# Patient Record
Sex: FEMALE | Race: WHITE | NOT HISPANIC OR LATINO | URBAN - METROPOLITAN AREA
[De-identification: names, ages, dates, MRNs, and addresses within clinical notes are randomized per-mention and may not be internally consistent; named-entity substitution may affect disease eponyms.]

---

## 2023-05-05 ENCOUNTER — OFFICE VISIT (OUTPATIENT)
Dept: PRIMARY CARE | Facility: CLINIC | Age: 43
End: 2023-05-05
Payer: COMMERCIAL

## 2023-05-05 ENCOUNTER — LAB (OUTPATIENT)
Dept: LAB | Facility: LAB | Age: 43
End: 2023-05-05
Payer: COMMERCIAL

## 2023-05-05 VITALS — WEIGHT: 287 LBS | BODY MASS INDEX: 46.12 KG/M2 | HEIGHT: 66 IN

## 2023-05-05 DIAGNOSIS — K64.4 BLEEDING EXTERNAL HEMORRHOIDS: Primary | ICD-10-CM

## 2023-05-05 DIAGNOSIS — E03.9 HYPOTHYROIDISM, UNSPECIFIED TYPE: ICD-10-CM

## 2023-05-05 DIAGNOSIS — K64.4 BLEEDING EXTERNAL HEMORRHOIDS: ICD-10-CM

## 2023-05-05 DIAGNOSIS — K64.5 THROMBOSED HEMORRHOIDS: ICD-10-CM

## 2023-05-05 PROBLEM — R20.0 BILATERAL LEG NUMBNESS: Status: ACTIVE | Noted: 2021-09-29

## 2023-05-05 PROBLEM — F41.8 DEPRESSION WITH ANXIETY: Status: ACTIVE | Noted: 2021-09-29

## 2023-05-05 PROBLEM — R59.0 CERVICAL LYMPHADENOPATHY: Status: ACTIVE | Noted: 2022-01-10

## 2023-05-05 PROBLEM — J39.0 RETROPHARYNGEAL AND PARAPHARYNGEAL ABSCESS: Status: ACTIVE | Noted: 2023-05-05

## 2023-05-05 PROBLEM — M79.89 SWELLING OF BOTH LOWER EXTREMITIES: Status: ACTIVE | Noted: 2021-11-04

## 2023-05-05 PROBLEM — E66.01 OBESITY, CLASS III, BMI 40-49.9 (MORBID OBESITY) (MULTI): Chronic | Status: ACTIVE | Noted: 2021-09-29

## 2023-05-05 PROBLEM — M25.569 KNEE PAIN: Status: ACTIVE | Noted: 2023-05-05

## 2023-05-05 PROBLEM — D64.9 ANEMIA: Status: ACTIVE | Noted: 2023-05-05

## 2023-05-05 PROBLEM — R20.0 NUMBNESS AND TINGLING: Status: ACTIVE | Noted: 2023-05-05

## 2023-05-05 PROBLEM — R20.2 NUMBNESS AND TINGLING: Status: ACTIVE | Noted: 2023-05-05

## 2023-05-05 PROBLEM — E66.813 OBESITY, CLASS III, BMI 40-49.9 (MORBID OBESITY): Chronic | Status: ACTIVE | Noted: 2021-09-29

## 2023-05-05 PROBLEM — E55.9 VITAMIN D DEFICIENCY: Status: ACTIVE | Noted: 2021-10-04

## 2023-05-05 PROBLEM — D50.9 IRON DEFICIENCY ANEMIA: Status: ACTIVE | Noted: 2021-10-04

## 2023-05-05 PROBLEM — I10 PRIMARY HYPERTENSION: Status: ACTIVE | Noted: 2022-01-10

## 2023-05-05 PROBLEM — E78.5 HYPERLIPIDEMIA: Status: ACTIVE | Noted: 2021-11-04

## 2023-05-05 PROBLEM — R53.83 FATIGUE: Status: ACTIVE | Noted: 2021-12-09

## 2023-05-05 PROBLEM — F34.1 PERSISTENT DEPRESSIVE DISORDER: Status: ACTIVE | Noted: 2021-11-06

## 2023-05-05 PROBLEM — H57.89 PERIORBITAL SWELLING: Status: ACTIVE | Noted: 2023-05-05

## 2023-05-05 PROBLEM — M54.9 BACK PAIN: Status: ACTIVE | Noted: 2023-05-05

## 2023-05-05 PROBLEM — H53.142: Status: ACTIVE | Noted: 2023-05-05

## 2023-05-05 PROBLEM — M25.50 ARTHRALGIA OF MULTIPLE SITES: Status: ACTIVE | Noted: 2023-05-05

## 2023-05-05 PROBLEM — F43.10 PTSD (POST-TRAUMATIC STRESS DISORDER): Status: ACTIVE | Noted: 2021-09-22

## 2023-05-05 PROBLEM — R06.83 SNORING: Status: ACTIVE | Noted: 2023-05-05

## 2023-05-05 PROBLEM — M62.830 PARASPINAL MUSCLE SPASM: Status: ACTIVE | Noted: 2021-09-29

## 2023-05-05 PROBLEM — R21 RASH OF FACE: Status: ACTIVE | Noted: 2023-05-05

## 2023-05-05 PROBLEM — F33.1 MODERATE EPISODE OF RECURRENT MAJOR DEPRESSIVE DISORDER (MULTI): Chronic | Status: ACTIVE | Noted: 2021-09-22

## 2023-05-05 PROBLEM — J02.9 SORE THROAT: Status: ACTIVE | Noted: 2023-05-05

## 2023-05-05 PROBLEM — R22.1 NECK MASS: Status: ACTIVE | Noted: 2021-09-29

## 2023-05-05 PROBLEM — R73.03 PREDIABETES: Status: ACTIVE | Noted: 2021-10-04

## 2023-05-05 PROCEDURE — 36415 COLL VENOUS BLD VENIPUNCTURE: CPT

## 2023-05-05 PROCEDURE — 84439 ASSAY OF FREE THYROXINE: CPT

## 2023-05-05 PROCEDURE — 85027 COMPLETE CBC AUTOMATED: CPT

## 2023-05-05 PROCEDURE — 99214 OFFICE O/P EST MOD 30 MIN: CPT | Performed by: INTERNAL MEDICINE

## 2023-05-05 PROCEDURE — 84443 ASSAY THYROID STIM HORMONE: CPT

## 2023-05-05 PROCEDURE — 80053 COMPREHEN METABOLIC PANEL: CPT

## 2023-05-05 PROCEDURE — 80061 LIPID PANEL: CPT

## 2023-05-05 RX ORDER — LEVOTHYROXINE SODIUM 150 UG/1
150 TABLET ORAL DAILY
Qty: 30 TABLET | Refills: 0 | Status: SHIPPED | OUTPATIENT
Start: 2023-05-05 | End: 2023-06-06 | Stop reason: ALTCHOICE

## 2023-05-05 RX ORDER — ATORVASTATIN CALCIUM 40 MG/1
40 TABLET, FILM COATED ORAL
COMMUNITY
Start: 2021-12-09 | End: 2023-06-06 | Stop reason: SDUPTHER

## 2023-05-05 RX ORDER — HYDROCORTISONE 25 MG/G
CREAM TOPICAL 4 TIMES DAILY PRN
Qty: 30 G | Refills: 3 | Status: SHIPPED | OUTPATIENT
Start: 2023-05-05 | End: 2023-06-06 | Stop reason: ALTCHOICE

## 2023-05-05 RX ORDER — LEVOTHYROXINE SODIUM 150 UG/1
1 TABLET ORAL DAILY
COMMUNITY
Start: 2022-05-24 | End: 2023-05-05 | Stop reason: SDUPTHER

## 2023-05-05 RX ORDER — VALACYCLOVIR HYDROCHLORIDE 1 G/1
1 TABLET, FILM COATED ORAL 3 TIMES DAILY
COMMUNITY
Start: 2022-07-25

## 2023-05-05 RX ORDER — LEVOTHYROXINE SODIUM 175 UG/1
175 TABLET ORAL
COMMUNITY
Start: 2022-02-02 | End: 2023-06-06 | Stop reason: SDUPTHER

## 2023-05-05 NOTE — PROGRESS NOTES
"Subjective   Patient ID: Elli Mejia is a 42 y.o. female who presents for Rectal Bleeding.    HPI   Patient is here for complaints of having rectal bleeding for the past 5 days.  She is having a lot of rectal spasm and pain and constant bleeding.  She was constipated before and had pushed hard and since then she was having symptoms  She is also overdue for blood work for cholesterol and thyroid     Past recap patient is here with complaints of rash on the left upper eyelid started on Friday. Over the weekend it spread the whole eyelid and she is having throbbing pain in the left eye also. Denies any discharge from the eye vision is okay feeling some photophobia  Patient is concerned that she has shingles because her mother had similar presentation with shingles        Patient is here for follow-up on blood work  Complaining of lower back pain  Complaining of right knee pain  Complaining of right elbow pain and right arm numbness and tingling  Complaining of not taking having enough strength in the right arm  She has lot of joint pains muscle pains and concern about having rheumatoid arthritis or lupus-like condition  Follow-up on hypothyroidism        past recap  To establish PCP  Patient is here for blood pressure check  Blood pressure running high  Feels very tired fatigue  Hypothyroidism        Past medical history: Depression, hypertension, tonsillectomy  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   Constitutional:       Appearance: Normal appearance.   HENT:      Head: Normocephalic and atraumatic.      Right Ear: Tympanic membrane, ear canal and external ear normal.      Left Ear: Tympanic membrane, ear canal and external ear " normal.      Nose: Nose normal.      Mouth/Throat:      Pharynx: Oropharynx is clear.   Eyes:      Extraocular Movements: Extraocular movements intact.      Conjunctiva/sclera: Conjunctivae normal.      Pupils: Pupils are equal, round, and reactive to light.   Cardiovascular:      Rate and Rhythm: Normal rate and regular rhythm.      Pulses: Normal pulses.      Heart sounds: Normal heart sounds.   Pulmonary:      Effort: Pulmonary effort is normal.      Breath sounds: Normal breath sounds.   Abdominal:      General: Abdomen is flat. Bowel sounds are normal.      Palpations: Abdomen is soft.      Comments: Thrombosed hemorrhoid   Musculoskeletal:      Cervical back: Normal range of motion and neck supple.   Skin:     General: Skin is warm and dry.   Neurological:      General: No focal deficit present.      Mental Status: She is alert and oriented to person, place, and time.   Psychiatric:         Mood and Affect: Mood normal.         Assessment/Plan   Problem List Items Addressed This Visit          Circulatory    Thrombosed hemorrhoids    Relevant Medications    hydrocortisone (Anusol-HC) 2.5 % rectal cream    Other Relevant Orders    CBC    Comprehensive Metabolic Panel    Lipid Panel    Thyroid Stimulating Hormone    Thyroxine, Free    Bleeding external hemorrhoids - Primary    Relevant Medications    hydrocortisone (Anusol-HC) 2.5 % rectal cream    Other Relevant Orders    CBC    Comprehensive Metabolic Panel    Lipid Panel    Thyroid Stimulating Hormone    Thyroxine, Free       Endocrine/Metabolic    Hypothyroidism    Relevant Medications    levothyroxine (Synthroid, Levoxyl) 150 mcg tablet    Other Relevant Orders    CBC    Comprehensive Metabolic Panel    Lipid Panel    Thyroid Stimulating Hormone    Thyroxine, Free       past recap  Patient's old chart reviewed  Blood pressure is high start Toprol-XL 25 mg a day  Check TSH for thyroid  Anemia on last blood work recheck CBC iron studies B12  Patient is high  risk for sleep apnea and she does snore  We will do sleep study  Follow-up after the test     56/31  To lumbosacral spine  X-ray right knee  Clinically appears that patient has tendinitis in the right elbow  We will get EMG test  Discussed using carpal tunnel brace we will do blood work to rule out connective tissue disorder  Encourage patient to speak  Discussed diet and exercise  Fatigue presenting for treat with anti-inflammatory  Blood work reviewed TSH suppressed  Decrease dose to 150 5days a week and 175 on weekends  6 weeks  Follow-up after the test     7/25  Rash definitely does not look like shingles  But patient is extremely worried about shingles and wants to be treated as shingles and if not better she will follow-up  Valtrex prescribed  Refer to ophthalmologist for photophobia very slight    5/5/2023  Patient has bleeding hemorrhoid and thrombosed small in size  Hemorrhoid care discussed  ProctoCream prescribed   Blood work ordered for thyroid and cholesterol  Stool softener  Follow-up after blood work next week

## 2023-05-06 LAB
ALANINE AMINOTRANSFERASE (SGPT) (U/L) IN SER/PLAS: 11 U/L (ref 7–45)
ALBUMIN (G/DL) IN SER/PLAS: 5 G/DL (ref 3.4–5)
ALKALINE PHOSPHATASE (U/L) IN SER/PLAS: 77 U/L (ref 33–110)
ANION GAP IN SER/PLAS: 17 MMOL/L (ref 10–20)
ASPARTATE AMINOTRANSFERASE (SGOT) (U/L) IN SER/PLAS: 16 U/L (ref 9–39)
BILIRUBIN TOTAL (MG/DL) IN SER/PLAS: 0.3 MG/DL (ref 0–1.2)
CALCIUM (MG/DL) IN SER/PLAS: 10.2 MG/DL (ref 8.6–10.6)
CARBON DIOXIDE, TOTAL (MMOL/L) IN SER/PLAS: 24 MMOL/L (ref 21–32)
CHLORIDE (MMOL/L) IN SER/PLAS: 101 MMOL/L (ref 98–107)
CHOLESTEROL (MG/DL) IN SER/PLAS: 263 MG/DL (ref 0–199)
CHOLESTEROL IN HDL (MG/DL) IN SER/PLAS: 41.3 MG/DL
CHOLESTEROL/HDL RATIO: 6.4
CREATININE (MG/DL) IN SER/PLAS: 0.96 MG/DL (ref 0.5–1.05)
ERYTHROCYTE DISTRIBUTION WIDTH (RATIO) BY AUTOMATED COUNT: 14 % (ref 11.5–14.5)
ERYTHROCYTE MEAN CORPUSCULAR HEMOGLOBIN CONCENTRATION (G/DL) BY AUTOMATED: 32.1 G/DL (ref 32–36)
ERYTHROCYTE MEAN CORPUSCULAR VOLUME (FL) BY AUTOMATED COUNT: 94 FL (ref 80–100)
ERYTHROCYTES (10*6/UL) IN BLOOD BY AUTOMATED COUNT: 4.35 X10E12/L (ref 4–5.2)
GFR FEMALE: 76 ML/MIN/1.73M2
GLUCOSE (MG/DL) IN SER/PLAS: 100 MG/DL (ref 74–99)
HEMATOCRIT (%) IN BLOOD BY AUTOMATED COUNT: 40.8 % (ref 36–46)
HEMOGLOBIN (G/DL) IN BLOOD: 13.1 G/DL (ref 12–16)
LDL: 159 MG/DL (ref 0–99)
LEUKOCYTES (10*3/UL) IN BLOOD BY AUTOMATED COUNT: 11.6 X10E9/L (ref 4.4–11.3)
NON HDL CHOLESTEROL: 222 MG/DL
NRBC (PER 100 WBCS) BY AUTOMATED COUNT: 0 /100 WBC (ref 0–0)
PLATELETS (10*3/UL) IN BLOOD AUTOMATED COUNT: 338 X10E9/L (ref 150–450)
POTASSIUM (MMOL/L) IN SER/PLAS: 4.5 MMOL/L (ref 3.5–5.3)
PROTEIN TOTAL: 7.9 G/DL (ref 6.4–8.2)
SODIUM (MMOL/L) IN SER/PLAS: 137 MMOL/L (ref 136–145)
THYROTROPIN (MIU/L) IN SER/PLAS BY DETECTION LIMIT <= 0.05 MIU/L: 61 MIU/L (ref 0.44–3.98)
THYROXINE (T4) FREE (NG/DL) IN SER/PLAS: 0.28 NG/DL (ref 0.78–1.48)
TRIGLYCERIDE (MG/DL) IN SER/PLAS: 312 MG/DL (ref 0–149)
UREA NITROGEN (MG/DL) IN SER/PLAS: 11 MG/DL (ref 6–23)
VLDL: 62 MG/DL (ref 0–40)

## 2023-06-05 DIAGNOSIS — M54.9 BACK PAIN, UNSPECIFIED BACK LOCATION, UNSPECIFIED BACK PAIN LATERALITY, UNSPECIFIED CHRONICITY: ICD-10-CM

## 2023-06-06 ENCOUNTER — OFFICE VISIT (OUTPATIENT)
Dept: PRIMARY CARE | Facility: CLINIC | Age: 43
End: 2023-06-06
Payer: COMMERCIAL

## 2023-06-06 VITALS
BODY MASS INDEX: 46.12 KG/M2 | SYSTOLIC BLOOD PRESSURE: 134 MMHG | DIASTOLIC BLOOD PRESSURE: 76 MMHG | WEIGHT: 287 LBS | HEIGHT: 66 IN

## 2023-06-06 DIAGNOSIS — E66.01 OBESITY, CLASS III, BMI 40-49.9 (MORBID OBESITY) (MULTI): Chronic | ICD-10-CM

## 2023-06-06 DIAGNOSIS — L30.9 DERMATITIS: ICD-10-CM

## 2023-06-06 DIAGNOSIS — M54.41 ACUTE RIGHT-SIDED LOW BACK PAIN WITH RIGHT-SIDED SCIATICA: ICD-10-CM

## 2023-06-06 DIAGNOSIS — E03.9 HYPOTHYROIDISM, UNSPECIFIED TYPE: Primary | ICD-10-CM

## 2023-06-06 DIAGNOSIS — E78.5 HYPERLIPIDEMIA, UNSPECIFIED HYPERLIPIDEMIA TYPE: ICD-10-CM

## 2023-06-06 DIAGNOSIS — I10 PRIMARY HYPERTENSION: ICD-10-CM

## 2023-06-06 PROCEDURE — 1036F TOBACCO NON-USER: CPT | Performed by: INTERNAL MEDICINE

## 2023-06-06 PROCEDURE — 99214 OFFICE O/P EST MOD 30 MIN: CPT | Performed by: INTERNAL MEDICINE

## 2023-06-06 PROCEDURE — 3075F SYST BP GE 130 - 139MM HG: CPT | Performed by: INTERNAL MEDICINE

## 2023-06-06 PROCEDURE — 3078F DIAST BP <80 MM HG: CPT | Performed by: INTERNAL MEDICINE

## 2023-06-06 RX ORDER — LEVOTHYROXINE SODIUM 175 UG/1
175 TABLET ORAL
Qty: 30 TABLET | Refills: 1 | Status: SHIPPED | OUTPATIENT
Start: 2023-06-06 | End: 2023-06-28

## 2023-06-06 RX ORDER — KETOCONAZOLE 20 MG/ML
SHAMPOO, SUSPENSION TOPICAL 2 TIMES WEEKLY
Qty: 120 ML | Refills: 0 | Status: SHIPPED | OUTPATIENT
Start: 2023-06-08

## 2023-06-06 RX ORDER — LEVOTHYROXINE SODIUM 175 UG/1
175 TABLET ORAL
Qty: 30 TABLET | Refills: 1 | Status: SHIPPED | OUTPATIENT
Start: 2023-06-06 | End: 2023-06-06 | Stop reason: SDUPTHER

## 2023-06-06 RX ORDER — ATORVASTATIN CALCIUM 40 MG/1
40 TABLET, FILM COATED ORAL
Qty: 90 TABLET | Refills: 0 | Status: SHIPPED | OUTPATIENT
Start: 2023-06-06 | End: 2023-10-05

## 2023-06-06 RX ORDER — HYDROCORTISONE 25 MG/G
CREAM TOPICAL 2 TIMES DAILY PRN
Qty: 30 G | Refills: 2 | Status: SHIPPED | OUTPATIENT
Start: 2023-06-06 | End: 2024-06-05

## 2023-06-06 NOTE — PROGRESS NOTES
Subjective   Patient ID: Elli Mejia is a 42 y.o. female who presents for Follow-up (results) and Med Refill.    HPI   Patient is here for follow-up needs refill on medication  Needs prescription for steroid cream for eczema  She has psoriasis on the scalp  Complaining of pain in the right side of the hip and the back she is having difficulty walking she fell last night on that hip  Wants to lose weight but having a difficult time losing weight needs some time    Past recap  Patient is here for complaints of having rectal bleeding for the past 5 days.  She is having a lot of rectal spasm and pain and constant bleeding.  She was constipated before and had pushed hard and since then she was having symptoms  She is also overdue for blood work for cholesterol and thyroid      Past recap patient is here with complaints of rash on the left upper eyelid started on Friday. Over the weekend it spread the whole eyelid and she is having throbbing pain in the left eye also. Denies any discharge from the eye vision is okay feeling some photophobia  Patient is concerned that she has shingles because her mother had similar presentation with shingles        Patient is here for follow-up on blood work  Complaining of lower back pain  Complaining of right knee pain  Complaining of right elbow pain and right arm numbness and tingling  Complaining of not taking having enough strength in the right arm  She has lot of joint pains muscle pains and concern about having rheumatoid arthritis or lupus-like condition  Follow-up on hypothyroidism        past recap  To establish PCP  Patient is here for blood pressure check  Blood pressure running high  Feels very tired fatigue  Hypothyroidism        Past medical history: Depression, hypertension, tonsillectomy  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   Constitutional:       Appearance: Normal appearance.   HENT:      Head: Normocephalic and atraumatic.      Right Ear: Tympanic membrane, ear canal and external ear normal.      Left Ear: Tympanic membrane, ear canal and external ear normal.      Nose: Nose normal.      Mouth/Throat:      Pharynx: Oropharynx is clear.   Eyes:      Extraocular Movements: Extraocular movements intact.      Conjunctiva/sclera: Conjunctivae normal.      Pupils: Pupils are equal, round, and reactive to light.   Cardiovascular:      Rate and Rhythm: Normal rate and regular rhythm.      Pulses: Normal pulses.      Heart sounds: Normal heart sounds.   Pulmonary:      Effort: Pulmonary effort is normal.      Breath sounds: Normal breath sounds.   Abdominal:      General: Abdomen is flat. Bowel sounds are normal.      Palpations: Abdomen is soft.   Musculoskeletal:      Cervical back: Normal range of motion and neck supple.      Comments: Tenderness in right pelvic   Skin:     General: Skin is warm and dry.   Neurological:      General: No focal deficit present.      Mental Status: She is alert and oriented to person, place, and time.   Psychiatric:         Mood and Affect: Mood normal.         Assessment/Plan   Problem List Items Addressed This Visit          Circulatory    Primary hypertension       Endocrine/Metabolic    Obesity, Class III, BMI 40-49.9 (morbid obesity) (CMS/HCC) (Chronic)    Relevant Medications    semaglutide 0.25 mg or 0.5 mg (2 mg/3 mL) pen injector    Other Relevant Orders    Referral to Bariatric Surgery    Hypothyroidism - Primary    Relevant Medications    levothyroxine (Synthroid, Levoxyl) 175 mcg tablet    Other Relevant Orders    Thyroxine, Free    Thyroid Stimulating Hormone       Other    Back pain    Relevant Orders    MR lumbar spine wo IV contrast    XR " pelvis 1-2 views (Completed)    Hyperlipidemia    Relevant Medications    atorvastatin (Lipitor) 40 mg tablet     Other Visit Diagnoses       Dermatitis        Relevant Medications    hydrocortisone 2.5 % cream    ketoconazole (NIZOral) 2 % shampoo (Start on 6/8/2023)             past recap  Patient's old chart reviewed  Blood pressure is high start Toprol-XL 25 mg a day  Check TSH for thyroid  Anemia on last blood work recheck CBC iron studies B12  Patient is high risk for sleep apnea and she does snore  We will do sleep study  Follow-up after the test     56/31  To lumbosacral spine  X-ray right knee  Clinically appears that patient has tendinitis in the right elbow  We will get EMG test  Discussed using carpal tunnel brace we will do blood work to rule out connective tissue disorder  Encourage patient to speak  Discussed diet and exercise  Fatigue presenting for treat with anti-inflammatory  Blood work reviewed TSH suppressed  Decrease dose to 150 5days a week and 175 on weekends  6 weeks  Follow-up after the test     7/25  Rash definitely does not look like shingles  But patient is extremely worried about shingles and wants to be treated as shingles and if not better she will follow-up  Valtrex prescribed  Refer to ophthalmologist for photophobia very slight     5/5/2023  Patient has bleeding hemorrhoid and thrombosed small in size  Hemorrhoid care discussed  ProctoCream prescribed   Blood work ordered for thyroid and cholesterol  Stool softener  Follow-up after blood work next week    6/6/2023  Blood work reviewed  TSH elevated at 61  Total cholesterol 263  triglycerides 312  Discussed lifestyle modification diet exercise  Will prescribe Ozempic to see if it helps to lose weight  Refer patient to gastric bypass  We will x-ray the pelvis and see if patient has any injury causing the pain  Patient has lumbar radiculopathy symptoms in the right leg also and had bad arthritis in the x-ray we will get MRI of  the lumbosacral spine  Ketoconazole shampoo and hydrocortisone cream to help with psoriasis and seborrheic dermatitis  Lipitor 40 mg a day for cholesterol  Increase dose of Synthroid  Repeat TSH free T4 in 6 weeks  Seborrheic dermatitis treat with ketoconazole shampoo and hydrocortisone cream for psoriasis  Follow-up in 6 weeks   3 = A little assistance

## 2023-06-09 RX ORDER — NABUMETONE 750 MG/1
750 TABLET, FILM COATED ORAL 2 TIMES DAILY
Qty: 60 TABLET | Refills: 0 | Status: SHIPPED | OUTPATIENT
Start: 2023-06-09 | End: 2023-07-26 | Stop reason: SDUPTHER

## 2023-06-09 RX ORDER — CYCLOBENZAPRINE HCL 10 MG
10 TABLET ORAL 3 TIMES DAILY PRN
Qty: 60 TABLET | Refills: 0 | Status: SHIPPED | OUTPATIENT
Start: 2023-06-09 | End: 2023-07-26 | Stop reason: SDUPTHER

## 2023-06-28 DIAGNOSIS — E03.9 HYPOTHYROIDISM, UNSPECIFIED TYPE: ICD-10-CM

## 2023-06-28 RX ORDER — LEVOTHYROXINE SODIUM 175 UG/1
175 TABLET ORAL
Qty: 30 TABLET | Refills: 1 | Status: SHIPPED | OUTPATIENT
Start: 2023-06-28 | End: 2023-09-01 | Stop reason: ALTCHOICE

## 2023-07-26 DIAGNOSIS — M54.9 BACK PAIN, UNSPECIFIED BACK LOCATION, UNSPECIFIED BACK PAIN LATERALITY, UNSPECIFIED CHRONICITY: ICD-10-CM

## 2023-07-27 RX ORDER — NABUMETONE 750 MG/1
750 TABLET, FILM COATED ORAL 2 TIMES DAILY
Qty: 60 TABLET | Refills: 0 | Status: SHIPPED | OUTPATIENT
Start: 2023-07-27 | End: 2024-07-26

## 2023-07-27 RX ORDER — CYCLOBENZAPRINE HCL 10 MG
10 TABLET ORAL 3 TIMES DAILY PRN
Qty: 60 TABLET | Refills: 0 | Status: SHIPPED | OUTPATIENT
Start: 2023-07-27 | End: 2023-09-25

## 2023-08-30 ENCOUNTER — LAB (OUTPATIENT)
Dept: LAB | Facility: LAB | Age: 43
End: 2023-08-30
Payer: COMMERCIAL

## 2023-08-30 DIAGNOSIS — E03.9 HYPOTHYROIDISM, UNSPECIFIED TYPE: ICD-10-CM

## 2023-08-30 LAB
THYROTROPIN (MIU/L) IN SER/PLAS BY DETECTION LIMIT <= 0.05 MIU/L: 13.9 MIU/L (ref 0.44–3.98)
THYROXINE (T4) FREE (NG/DL) IN SER/PLAS: 0.74 NG/DL (ref 0.78–1.48)

## 2023-08-30 PROCEDURE — 36415 COLL VENOUS BLD VENIPUNCTURE: CPT

## 2023-08-30 PROCEDURE — 84439 ASSAY OF FREE THYROXINE: CPT

## 2023-08-30 PROCEDURE — 84443 ASSAY THYROID STIM HORMONE: CPT

## 2023-09-01 ENCOUNTER — OFFICE VISIT (OUTPATIENT)
Dept: PRIMARY CARE | Facility: CLINIC | Age: 43
End: 2023-09-01
Payer: COMMERCIAL

## 2023-09-01 VITALS
WEIGHT: 287 LBS | SYSTOLIC BLOOD PRESSURE: 130 MMHG | BODY MASS INDEX: 47.82 KG/M2 | HEIGHT: 65 IN | DIASTOLIC BLOOD PRESSURE: 74 MMHG

## 2023-09-01 DIAGNOSIS — E78.2 MIXED HYPERLIPIDEMIA: Primary | ICD-10-CM

## 2023-09-01 DIAGNOSIS — E03.8 OTHER SPECIFIED HYPOTHYROIDISM: ICD-10-CM

## 2023-09-01 DIAGNOSIS — R73.03 PREDIABETES: ICD-10-CM

## 2023-09-01 DIAGNOSIS — E66.01 OBESITY, CLASS III, BMI 40-49.9 (MORBID OBESITY) (MULTI): Chronic | ICD-10-CM

## 2023-09-01 PROCEDURE — 3075F SYST BP GE 130 - 139MM HG: CPT | Performed by: INTERNAL MEDICINE

## 2023-09-01 PROCEDURE — 99214 OFFICE O/P EST MOD 30 MIN: CPT | Performed by: INTERNAL MEDICINE

## 2023-09-01 PROCEDURE — 1036F TOBACCO NON-USER: CPT | Performed by: INTERNAL MEDICINE

## 2023-09-01 PROCEDURE — 3078F DIAST BP <80 MM HG: CPT | Performed by: INTERNAL MEDICINE

## 2023-09-01 RX ORDER — LEVOTHYROXINE SODIUM 200 UG/1
200 TABLET ORAL DAILY
Qty: 30 TABLET | Refills: 2 | Status: SHIPPED | OUTPATIENT
Start: 2023-09-01 | End: 2023-09-28

## 2023-09-01 NOTE — PROGRESS NOTES
"Subjective   Patient ID: Elli Mejia is a 43 y.o. female who presents for Follow-up (results).    HPI   Patient is here for follow-up needs refill on medication  Needs prescription for steroid cream for eczema  She has psoriasis on the scalp  Complaining of pain in the right side of the hip and the back she is having difficulty walking she fell last night on that hip  Wants to lose weight but having a difficult time losing weight needs some time     Past recap  Patient is here for complaints of having rectal bleeding for the past 5 days.  She is having a lot of rectal spasm and pain and constant bleeding.  She was constipated before and had pushed hard and since then she was having symptoms  She is also overdue for blood work for cholesterol and thyroid      Past recap patient is here with complaints of rash on the left upper eyelid started on Friday. Over the weekend it spread the whole eyelid and she is having throbbing pain in the left eye also. Denies any discharge from the eye vision is okay feeling some photophobia  Patient is concerned that she has shingles because her mother had similar presentation with shingles        Patient is here for follow-up on blood work  Complaining of lower back pain  Complaining of right knee pain  Complaining of right elbow pain and right arm numbness and tingling  Complaining of not taking having enough strength in the right arm  She has lot of joint pains muscle pains and concern about having rheumatoid arthritis or lupus-like condition  Follow-up on hypothyroidism  Review of Systems    Objective   /74   Ht 1.651 m (5' 5\")   Wt 130 kg (287 lb)   BMI 47.76 kg/m²     Physical Exam  Vitals reviewed.   Constitutional:       Appearance: Normal appearance.   HENT:      Head: Normocephalic and atraumatic.      Right Ear: Tympanic membrane, ear canal and external ear normal.      Left Ear: Tympanic membrane, ear canal and external ear normal.      Nose: Nose normal.    "   Mouth/Throat:      Pharynx: Oropharynx is clear.   Eyes:      Extraocular Movements: Extraocular movements intact.      Conjunctiva/sclera: Conjunctivae normal.      Pupils: Pupils are equal, round, and reactive to light.   Cardiovascular:      Rate and Rhythm: Normal rate and regular rhythm.      Pulses: Normal pulses.      Heart sounds: Normal heart sounds.   Pulmonary:      Effort: Pulmonary effort is normal.      Breath sounds: Normal breath sounds.   Abdominal:      General: Abdomen is flat. Bowel sounds are normal.      Palpations: Abdomen is soft.   Musculoskeletal:      Cervical back: Normal range of motion and neck supple.   Skin:     General: Skin is warm and dry.   Neurological:      General: No focal deficit present.      Mental Status: She is alert and oriented to person, place, and time.   Psychiatric:         Mood and Affect: Mood normal.         Assessment/Plan   Problem List Items Addressed This Visit          Cardiac and Vasculature    Hyperlipidemia - Primary    Relevant Medications    semaglutide, weight loss, (Wegovy) 0.25 mg/0.5 mL pen injector    Other Relevant Orders    Lipid Panel    CBC       Endocrine/Metabolic    Obesity, Class III, BMI 40-49.9 (morbid obesity) (CMS/HCC) (Chronic)    Relevant Medications    semaglutide, weight loss, (Wegovy) 0.25 mg/0.5 mL pen injector    Hypothyroidism    Relevant Medications    levothyroxine (Synthroid, Levoxyl) 200 mcg tablet    semaglutide, weight loss, (Wegovy) 0.25 mg/0.5 mL pen injector    Other Relevant Orders    TSH with reflex to Free T4 if abnormal    Prediabetes    Relevant Medications    semaglutide, weight loss, (Wegovy) 0.25 mg/0.5 mL pen injector     past recap  Patient's old chart reviewed  Blood pressure is high start Toprol-XL 25 mg a day  Check TSH for thyroid  Anemia on last blood work recheck CBC iron studies B12  Patient is high risk for sleep apnea and she does snore  We will do sleep study  Follow-up after the test     56/31  To  lumbosacral spine  X-ray right knee  Clinically appears that patient has tendinitis in the right elbow  We will get EMG test  Discussed using carpal tunnel brace we will do blood work to rule out connective tissue disorder  Encourage patient to speak  Discussed diet and exercise  Fatigue presenting for treat with anti-inflammatory  Blood work reviewed TSH suppressed  Decrease dose to 150 5days a week and 175 on weekends  6 weeks  Follow-up after the test     7/25  Rash definitely does not look like shingles  But patient is extremely worried about shingles and wants to be treated as shingles and if not better she will follow-up  Valtrex prescribed  Refer to ophthalmologist for photophobia very slight     5/5/2023  Patient has bleeding hemorrhoid and thrombosed small in size  Hemorrhoid care discussed  ProctoCream prescribed   Blood work ordered for thyroid and cholesterol  Stool softener  Follow-up after blood work next week     6/6/2023  Blood work reviewed  TSH elevated at 61  Total cholesterol 263  triglycerides 312  Discussed lifestyle modification diet exercise  Will prescribe Ozempic to see if it helps to lose weight  Refer patient to gastric bypass  We will x-ray the pelvis and see if patient has any injury causing the pain  Patient has lumbar radiculopathy symptoms in the right leg also and had bad arthritis in the x-ray we will get MRI of the lumbosacral spine  Ketoconazole shampoo and hydrocortisone cream to help with psoriasis and seborrheic dermatitis  Lipitor 40 mg a day for cholesterol  Increase dose of Synthroid  Repeat TSH free T4 in 6 weeks  Seborrheic dermatitis treat with ketoconazole shampoo and hydrocortisone cream for psoriasis  Follow-up in 6 weeks    9/1/2023  Blood work reviewed  TSH still elevated  Increase Synthroid to 225  Cholesterol was high last time we will repeat the labs in 3 months  Discussed weight loss strategies  We will try if [employer] be covered by the insurance  Discussed  complications and side effects  Also encouraged diet and exercise

## 2023-09-05 RX ORDER — SEMAGLUTIDE 0.25 MG/.5ML
0.25 INJECTION, SOLUTION SUBCUTANEOUS
Qty: 2 ML | Refills: 0 | Status: SHIPPED | OUTPATIENT
Start: 2023-09-05 | End: 2023-09-27

## 2023-09-26 DIAGNOSIS — E03.8 OTHER SPECIFIED HYPOTHYROIDISM: ICD-10-CM

## 2023-09-28 RX ORDER — LEVOTHYROXINE SODIUM 200 UG/1
200 TABLET ORAL DAILY
Qty: 30 TABLET | Refills: 2 | Status: SHIPPED | OUTPATIENT
Start: 2023-09-28 | End: 2023-12-27

## 2023-10-05 DIAGNOSIS — E78.5 HYPERLIPIDEMIA, UNSPECIFIED HYPERLIPIDEMIA TYPE: ICD-10-CM

## 2023-10-05 RX ORDER — ATORVASTATIN CALCIUM 40 MG/1
40 TABLET, FILM COATED ORAL DAILY
Qty: 90 TABLET | Refills: 0 | Status: SHIPPED | OUTPATIENT
Start: 2023-10-05

## 2023-10-30 PROBLEM — J02.9 ACUTE PHARYNGITIS: Status: ACTIVE | Noted: 2023-10-30

## 2023-10-30 PROBLEM — S93.409A SPRAIN OF ANKLE: Status: ACTIVE | Noted: 2023-10-30

## 2023-10-30 PROBLEM — J03.90 ACUTE TONSILLITIS: Status: ACTIVE | Noted: 2023-10-30

## 2023-10-30 PROBLEM — K06.8 BLEEDING GUMS: Status: ACTIVE | Noted: 2023-10-30

## 2023-10-30 PROBLEM — S99.919A INJURY OF ANKLE: Status: ACTIVE | Noted: 2023-10-30

## 2023-10-30 PROBLEM — S80.819A ABRASION OF LOWER LIMB: Status: ACTIVE | Noted: 2023-10-30

## 2023-10-30 RX ORDER — CHLORHEXIDINE GLUCONATE ORAL RINSE 1.2 MG/ML
SOLUTION DENTAL
COMMUNITY
Start: 2023-07-17

## 2024-10-02 ENCOUNTER — APPOINTMENT (OUTPATIENT)
Dept: RADIOLOGY | Facility: HOSPITAL | Age: 44
End: 2024-10-02
Payer: COMMERCIAL

## 2024-10-02 ENCOUNTER — HOSPITAL ENCOUNTER (EMERGENCY)
Facility: HOSPITAL | Age: 44
Discharge: HOME | End: 2024-10-02
Attending: STUDENT IN AN ORGANIZED HEALTH CARE EDUCATION/TRAINING PROGRAM
Payer: COMMERCIAL

## 2024-10-02 VITALS
OXYGEN SATURATION: 100 % | HEIGHT: 66 IN | DIASTOLIC BLOOD PRESSURE: 114 MMHG | TEMPERATURE: 97.5 F | SYSTOLIC BLOOD PRESSURE: 168 MMHG | WEIGHT: 293 LBS | RESPIRATION RATE: 18 BRPM | BODY MASS INDEX: 47.09 KG/M2 | HEART RATE: 88 BPM

## 2024-10-02 DIAGNOSIS — M25.511 ACUTE PAIN OF RIGHT SHOULDER: Primary | ICD-10-CM

## 2024-10-02 PROCEDURE — 96372 THER/PROPH/DIAG INJ SC/IM: CPT | Performed by: STUDENT IN AN ORGANIZED HEALTH CARE EDUCATION/TRAINING PROGRAM

## 2024-10-02 PROCEDURE — 73030 X-RAY EXAM OF SHOULDER: CPT | Mod: RT

## 2024-10-02 PROCEDURE — 99283 EMERGENCY DEPT VISIT LOW MDM: CPT

## 2024-10-02 PROCEDURE — 2500000001 HC RX 250 WO HCPCS SELF ADMINISTERED DRUGS (ALT 637 FOR MEDICARE OP): Performed by: STUDENT IN AN ORGANIZED HEALTH CARE EDUCATION/TRAINING PROGRAM

## 2024-10-02 PROCEDURE — 73030 X-RAY EXAM OF SHOULDER: CPT | Mod: RIGHT SIDE | Performed by: RADIOLOGY

## 2024-10-02 PROCEDURE — 2500000004 HC RX 250 GENERAL PHARMACY W/ HCPCS (ALT 636 FOR OP/ED): Performed by: STUDENT IN AN ORGANIZED HEALTH CARE EDUCATION/TRAINING PROGRAM

## 2024-10-02 RX ORDER — OXYCODONE HYDROCHLORIDE 5 MG/1
5 TABLET ORAL ONCE
Status: COMPLETED | OUTPATIENT
Start: 2024-10-02 | End: 2024-10-02

## 2024-10-02 RX ORDER — KETOROLAC TROMETHAMINE 30 MG/ML
15 INJECTION, SOLUTION INTRAMUSCULAR; INTRAVENOUS ONCE
Status: COMPLETED | OUTPATIENT
Start: 2024-10-02 | End: 2024-10-02

## 2024-10-02 RX ORDER — METHOCARBAMOL 500 MG/1
1000 TABLET, FILM COATED ORAL ONCE
Status: COMPLETED | OUTPATIENT
Start: 2024-10-02 | End: 2024-10-02

## 2024-10-02 RX ORDER — NAPROXEN 500 MG/1
500 TABLET ORAL
Qty: 30 TABLET | Refills: 0 | Status: SHIPPED | OUTPATIENT
Start: 2024-10-02 | End: 2024-10-17

## 2024-10-02 RX ORDER — METHOCARBAMOL 750 MG/1
750 TABLET, FILM COATED ORAL 2 TIMES DAILY
Qty: 20 TABLET | Refills: 0 | Status: SHIPPED | OUTPATIENT
Start: 2024-10-02 | End: 2024-10-12

## 2024-10-02 RX ORDER — OXYCODONE HYDROCHLORIDE 5 MG/1
5 TABLET ORAL EVERY 6 HOURS PRN
Qty: 15 TABLET | Refills: 0 | Status: SHIPPED | OUTPATIENT
Start: 2024-10-02 | End: 2024-10-06

## 2024-10-02 ASSESSMENT — PAIN DESCRIPTION - DESCRIPTORS: DESCRIPTORS: SHARP;STABBING

## 2024-10-02 ASSESSMENT — COLUMBIA-SUICIDE SEVERITY RATING SCALE - C-SSRS
1. IN THE PAST MONTH, HAVE YOU WISHED YOU WERE DEAD OR WISHED YOU COULD GO TO SLEEP AND NOT WAKE UP?: NO
2. HAVE YOU ACTUALLY HAD ANY THOUGHTS OF KILLING YOURSELF?: NO
1. IN THE PAST MONTH, HAVE YOU WISHED YOU WERE DEAD OR WISHED YOU COULD GO TO SLEEP AND NOT WAKE UP?: NO
6. HAVE YOU EVER DONE ANYTHING, STARTED TO DO ANYTHING, OR PREPARED TO DO ANYTHING TO END YOUR LIFE?: NO
6. HAVE YOU EVER DONE ANYTHING, STARTED TO DO ANYTHING, OR PREPARED TO DO ANYTHING TO END YOUR LIFE?: NO
2. HAVE YOU ACTUALLY HAD ANY THOUGHTS OF KILLING YOURSELF?: NO

## 2024-10-02 ASSESSMENT — PAIN SCALES - GENERAL
PAINLEVEL_OUTOF10: 10 - WORST POSSIBLE PAIN
PAINLEVEL_OUTOF10: 0 - NO PAIN

## 2024-10-02 ASSESSMENT — PAIN DESCRIPTION - FREQUENCY: FREQUENCY: CONSTANT/CONTINUOUS

## 2024-10-02 ASSESSMENT — PAIN DESCRIPTION - ONSET: ONSET: SUDDEN

## 2024-10-02 ASSESSMENT — PAIN DESCRIPTION - LOCATION: LOCATION: SHOULDER

## 2024-10-02 ASSESSMENT — PAIN DESCRIPTION - PROGRESSION: CLINICAL_PROGRESSION: NOT CHANGED

## 2024-10-02 ASSESSMENT — PAIN DESCRIPTION - PAIN TYPE: TYPE: ACUTE PAIN

## 2024-10-02 ASSESSMENT — PAIN - FUNCTIONAL ASSESSMENT
PAIN_FUNCTIONAL_ASSESSMENT: 0-10
PAIN_FUNCTIONAL_ASSESSMENT: 0-10

## 2024-10-02 ASSESSMENT — PAIN DESCRIPTION - ORIENTATION: ORIENTATION: RIGHT

## 2024-10-02 NOTE — DISCHARGE INSTRUCTIONS
You were seen in the emergency department today for shoulder pain.  At this time you do not need to stay in the hospital.  As discussed, you likely have a strain or sprain related to the fall.  I have prescribed you pain medication which I recommend you use as needed.  If the pain continues past 1 to 2 weeks, it is likely that you have a rotator cuff injury and should be seen by an orthopedic provider.  I provided you with the name of a local orthopedist who can evaluate you and discuss whether or not you need further imaging.  If you have any other concerns, you can return to the emergency department for reevaluation.

## 2024-10-10 ENCOUNTER — APPOINTMENT (OUTPATIENT)
Dept: CARDIOLOGY | Facility: HOSPITAL | Age: 44
End: 2024-10-10
Payer: COMMERCIAL

## 2024-10-10 ENCOUNTER — APPOINTMENT (OUTPATIENT)
Dept: RADIOLOGY | Facility: HOSPITAL | Age: 44
End: 2024-10-10
Payer: COMMERCIAL

## 2024-10-10 ENCOUNTER — HOSPITAL ENCOUNTER (INPATIENT)
Facility: HOSPITAL | Age: 44
End: 2024-10-10
Attending: STUDENT IN AN ORGANIZED HEALTH CARE EDUCATION/TRAINING PROGRAM | Admitting: INTERNAL MEDICINE
Payer: COMMERCIAL

## 2024-10-10 DIAGNOSIS — D72.829 LEUKOCYTOSIS, UNSPECIFIED TYPE: ICD-10-CM

## 2024-10-10 DIAGNOSIS — R07.9 ACUTE CHEST PAIN: ICD-10-CM

## 2024-10-10 DIAGNOSIS — R79.89 ELEVATED TROPONIN: ICD-10-CM

## 2024-10-10 DIAGNOSIS — R94.31 ACUTE ELECTROCARDIOGRAM CHANGES: ICD-10-CM

## 2024-10-10 DIAGNOSIS — I10 PRIMARY HYPERTENSION: ICD-10-CM

## 2024-10-10 DIAGNOSIS — S46.011D TRAUMATIC TEAR OF RIGHT ROTATOR CUFF, UNSPECIFIED TEAR EXTENT, SUBSEQUENT ENCOUNTER: ICD-10-CM

## 2024-10-10 DIAGNOSIS — E89.0 POSTOPERATIVE HYPOTHYROIDISM: ICD-10-CM

## 2024-10-10 DIAGNOSIS — Z91.148 NONCOMPLIANCE WITH MEDICATION REGIMEN: ICD-10-CM

## 2024-10-10 DIAGNOSIS — R06.02 SOB (SHORTNESS OF BREATH): ICD-10-CM

## 2024-10-10 DIAGNOSIS — R07.2 PRECORDIAL PAIN: ICD-10-CM

## 2024-10-10 DIAGNOSIS — I48.92 ATRIAL FLUTTER WITH RAPID VENTRICULAR RESPONSE (MULTI): Primary | ICD-10-CM

## 2024-10-10 DIAGNOSIS — E03.9 HYPOTHYROIDISM, UNSPECIFIED TYPE: ICD-10-CM

## 2024-10-10 LAB
ALBUMIN SERPL BCP-MCNC: 4 G/DL (ref 3.4–5)
ALP SERPL-CCNC: 57 U/L (ref 33–110)
ALT SERPL W P-5'-P-CCNC: 22 U/L (ref 7–45)
AMPHETAMINES UR QL SCN: ABNORMAL
ANION GAP SERPL CALCULATED.3IONS-SCNC: 20 MMOL/L (ref 10–20)
APPEARANCE UR: ABNORMAL
AST SERPL W P-5'-P-CCNC: 26 U/L (ref 9–39)
B-HCG SERPL-ACNC: <2 MIU/ML
BARBITURATES UR QL SCN: ABNORMAL
BASOPHILS # BLD AUTO: 0.13 X10*3/UL (ref 0–0.1)
BASOPHILS NFR BLD AUTO: 0.6 %
BENZODIAZ UR QL SCN: ABNORMAL
BILIRUB SERPL-MCNC: 0.3 MG/DL (ref 0–1.2)
BILIRUB UR STRIP.AUTO-MCNC: NEGATIVE MG/DL
BUN SERPL-MCNC: 52 MG/DL (ref 6–23)
BZE UR QL SCN: ABNORMAL
CALCIUM SERPL-MCNC: 8.7 MG/DL (ref 8.6–10.3)
CANNABINOIDS UR QL SCN: ABNORMAL
CARDIAC TROPONIN I PNL SERPL HS: 13 NG/L (ref 0–13)
CARDIAC TROPONIN I PNL SERPL HS: 44 NG/L (ref 0–13)
CHLORIDE SERPL-SCNC: 102 MMOL/L (ref 98–107)
CO2 SERPL-SCNC: 18 MMOL/L (ref 21–32)
COLOR UR: ABNORMAL
CREAT SERPL-MCNC: 0.92 MG/DL (ref 0.5–1.05)
EGFRCR SERPLBLD CKD-EPI 2021: 80 ML/MIN/1.73M*2
EOSINOPHIL # BLD AUTO: 0.42 X10*3/UL (ref 0–0.7)
EOSINOPHIL NFR BLD AUTO: 1.9 %
ERYTHROCYTE [DISTWIDTH] IN BLOOD BY AUTOMATED COUNT: 15.2 % (ref 11.5–14.5)
FENTANYL+NORFENTANYL UR QL SCN: ABNORMAL
GLUCOSE SERPL-MCNC: 190 MG/DL (ref 74–99)
GLUCOSE UR STRIP.AUTO-MCNC: NORMAL MG/DL
HCT VFR BLD AUTO: 30.8 % (ref 36–46)
HGB BLD-MCNC: 9.8 G/DL (ref 12–16)
IMM GRANULOCYTES # BLD AUTO: 0.17 X10*3/UL (ref 0–0.7)
IMM GRANULOCYTES NFR BLD AUTO: 0.8 % (ref 0–0.9)
KETONES UR STRIP.AUTO-MCNC: ABNORMAL MG/DL
LACTATE SERPL-SCNC: 1.7 MMOL/L (ref 0.4–2)
LACTATE SERPL-SCNC: 2.8 MMOL/L (ref 0.4–2)
LEUKOCYTE ESTERASE UR QL STRIP.AUTO: ABNORMAL
LYMPHOCYTES # BLD AUTO: 7.41 X10*3/UL (ref 1.2–4.8)
LYMPHOCYTES NFR BLD AUTO: 33.2 %
MAGNESIUM SERPL-MCNC: 1.87 MG/DL (ref 1.6–2.4)
MCH RBC QN AUTO: 28.9 PG (ref 26–34)
MCHC RBC AUTO-ENTMCNC: 31.8 G/DL (ref 32–36)
MCV RBC AUTO: 91 FL (ref 80–100)
METHADONE UR QL SCN: ABNORMAL
MONOCYTES # BLD AUTO: 1.21 X10*3/UL (ref 0.1–1)
MONOCYTES NFR BLD AUTO: 5.4 %
MUCOUS THREADS #/AREA URNS AUTO: ABNORMAL /LPF
NEUTROPHILS # BLD AUTO: 13.01 X10*3/UL (ref 1.2–7.7)
NEUTROPHILS NFR BLD AUTO: 58.1 %
NITRITE UR QL STRIP.AUTO: NEGATIVE
NRBC BLD-RTO: 0.1 /100 WBCS (ref 0–0)
OPIATES UR QL SCN: ABNORMAL
OXYCODONE+OXYMORPHONE UR QL SCN: ABNORMAL
PCP UR QL SCN: ABNORMAL
PH UR STRIP.AUTO: 5.5 [PH]
PLATELET # BLD AUTO: 464 X10*3/UL (ref 150–450)
POTASSIUM SERPL-SCNC: 4.8 MMOL/L (ref 3.5–5.3)
PROT SERPL-MCNC: 6.9 G/DL (ref 6.4–8.2)
PROT UR STRIP.AUTO-MCNC: NEGATIVE MG/DL
RBC # BLD AUTO: 3.39 X10*6/UL (ref 4–5.2)
RBC # UR STRIP.AUTO: NEGATIVE /UL
RBC #/AREA URNS AUTO: ABNORMAL /HPF
RBC MORPH BLD: NORMAL
SODIUM SERPL-SCNC: 135 MMOL/L (ref 136–145)
SP GR UR STRIP.AUTO: 1.03
SQUAMOUS #/AREA URNS AUTO: ABNORMAL /HPF
STOMATOCYTES BLD QL SMEAR: NORMAL
T4 FREE SERPL-MCNC: 0.63 NG/DL (ref 0.61–1.12)
TSH SERPL-ACNC: 40.27 MIU/L (ref 0.44–3.98)
UROBILINOGEN UR STRIP.AUTO-MCNC: NORMAL MG/DL
WBC # BLD AUTO: 22.4 X10*3/UL (ref 4.4–11.3)
WBC #/AREA URNS AUTO: ABNORMAL /HPF

## 2024-10-10 PROCEDURE — 93010 ELECTROCARDIOGRAM REPORT: CPT | Performed by: INTERNAL MEDICINE

## 2024-10-10 PROCEDURE — 93005 ELECTROCARDIOGRAM TRACING: CPT

## 2024-10-10 PROCEDURE — 83735 ASSAY OF MAGNESIUM: CPT | Performed by: STUDENT IN AN ORGANIZED HEALTH CARE EDUCATION/TRAINING PROGRAM

## 2024-10-10 PROCEDURE — 2500000005 HC RX 250 GENERAL PHARMACY W/O HCPCS: Performed by: STUDENT IN AN ORGANIZED HEALTH CARE EDUCATION/TRAINING PROGRAM

## 2024-10-10 PROCEDURE — 81001 URINALYSIS AUTO W/SCOPE: CPT | Performed by: STUDENT IN AN ORGANIZED HEALTH CARE EDUCATION/TRAINING PROGRAM

## 2024-10-10 PROCEDURE — 71045 X-RAY EXAM CHEST 1 VIEW: CPT | Performed by: RADIOLOGY

## 2024-10-10 PROCEDURE — 80053 COMPREHEN METABOLIC PANEL: CPT | Performed by: STUDENT IN AN ORGANIZED HEALTH CARE EDUCATION/TRAINING PROGRAM

## 2024-10-10 PROCEDURE — 9420000001 HC RT PATIENT EDUCATION 5 MIN

## 2024-10-10 PROCEDURE — 85025 COMPLETE CBC W/AUTO DIFF WBC: CPT | Performed by: STUDENT IN AN ORGANIZED HEALTH CARE EDUCATION/TRAINING PROGRAM

## 2024-10-10 PROCEDURE — 2500000004 HC RX 250 GENERAL PHARMACY W/ HCPCS (ALT 636 FOR OP/ED): Performed by: EMERGENCY MEDICINE

## 2024-10-10 PROCEDURE — 71275 CT ANGIOGRAPHY CHEST: CPT

## 2024-10-10 PROCEDURE — 2500000005 HC RX 250 GENERAL PHARMACY W/O HCPCS: Performed by: EMERGENCY MEDICINE

## 2024-10-10 PROCEDURE — 84439 ASSAY OF FREE THYROXINE: CPT | Performed by: STUDENT IN AN ORGANIZED HEALTH CARE EDUCATION/TRAINING PROGRAM

## 2024-10-10 PROCEDURE — 92960 CARDIOVERSION ELECTRIC EXT: CPT

## 2024-10-10 PROCEDURE — 2500000004 HC RX 250 GENERAL PHARMACY W/ HCPCS (ALT 636 FOR OP/ED): Performed by: STUDENT IN AN ORGANIZED HEALTH CARE EDUCATION/TRAINING PROGRAM

## 2024-10-10 PROCEDURE — 2550000001 HC RX 255 CONTRASTS: Performed by: STUDENT IN AN ORGANIZED HEALTH CARE EDUCATION/TRAINING PROGRAM

## 2024-10-10 PROCEDURE — 84702 CHORIONIC GONADOTROPIN TEST: CPT | Performed by: STUDENT IN AN ORGANIZED HEALTH CARE EDUCATION/TRAINING PROGRAM

## 2024-10-10 PROCEDURE — 83605 ASSAY OF LACTIC ACID: CPT | Performed by: STUDENT IN AN ORGANIZED HEALTH CARE EDUCATION/TRAINING PROGRAM

## 2024-10-10 PROCEDURE — 2060000001 HC INTERMEDIATE ICU ROOM DAILY

## 2024-10-10 PROCEDURE — 71045 X-RAY EXAM CHEST 1 VIEW: CPT

## 2024-10-10 PROCEDURE — 80307 DRUG TEST PRSMV CHEM ANLYZR: CPT | Performed by: STUDENT IN AN ORGANIZED HEALTH CARE EDUCATION/TRAINING PROGRAM

## 2024-10-10 PROCEDURE — 87040 BLOOD CULTURE FOR BACTERIA: CPT | Mod: TRILAB | Performed by: STUDENT IN AN ORGANIZED HEALTH CARE EDUCATION/TRAINING PROGRAM

## 2024-10-10 PROCEDURE — 2500000001 HC RX 250 WO HCPCS SELF ADMINISTERED DRUGS (ALT 637 FOR MEDICARE OP): Performed by: EMERGENCY MEDICINE

## 2024-10-10 PROCEDURE — 96374 THER/PROPH/DIAG INJ IV PUSH: CPT | Mod: 59

## 2024-10-10 PROCEDURE — 96375 TX/PRO/DX INJ NEW DRUG ADDON: CPT | Mod: 59

## 2024-10-10 PROCEDURE — 99291 CRITICAL CARE FIRST HOUR: CPT

## 2024-10-10 PROCEDURE — 36415 COLL VENOUS BLD VENIPUNCTURE: CPT | Performed by: STUDENT IN AN ORGANIZED HEALTH CARE EDUCATION/TRAINING PROGRAM

## 2024-10-10 PROCEDURE — 5A2204Z RESTORATION OF CARDIAC RHYTHM, SINGLE: ICD-10-PCS | Performed by: INTERNAL MEDICINE

## 2024-10-10 PROCEDURE — 94681 O2 UPTK CO2 OUTP % O2 XTRC: CPT

## 2024-10-10 PROCEDURE — 84484 ASSAY OF TROPONIN QUANT: CPT | Performed by: STUDENT IN AN ORGANIZED HEALTH CARE EDUCATION/TRAINING PROGRAM

## 2024-10-10 PROCEDURE — 2500000004 HC RX 250 GENERAL PHARMACY W/ HCPCS (ALT 636 FOR OP/ED): Performed by: INTERNAL MEDICINE

## 2024-10-10 PROCEDURE — 96361 HYDRATE IV INFUSION ADD-ON: CPT | Mod: 59

## 2024-10-10 PROCEDURE — 84443 ASSAY THYROID STIM HORMONE: CPT | Performed by: STUDENT IN AN ORGANIZED HEALTH CARE EDUCATION/TRAINING PROGRAM

## 2024-10-10 RX ORDER — ENOXAPARIN SODIUM 100 MG/ML
40 INJECTION SUBCUTANEOUS EVERY 12 HOURS SCHEDULED
Status: DISCONTINUED | OUTPATIENT
Start: 2024-10-10 | End: 2024-10-12

## 2024-10-10 RX ORDER — FENTANYL CITRATE 50 UG/ML
100 INJECTION, SOLUTION INTRAMUSCULAR; INTRAVENOUS ONCE
Status: COMPLETED | OUTPATIENT
Start: 2024-10-10 | End: 2024-10-10

## 2024-10-10 RX ORDER — DILTIAZEM HYDROCHLORIDE 5 MG/ML
25 INJECTION INTRAVENOUS ONCE
Status: COMPLETED | OUTPATIENT
Start: 2024-10-10 | End: 2024-10-10

## 2024-10-10 RX ORDER — DILTIAZEM HCL/D5W 125 MG/125
5-15 PLASTIC BAG, INJECTION (ML) INTRAVENOUS CONTINUOUS
Status: DISCONTINUED | OUTPATIENT
Start: 2024-10-10 | End: 2024-10-11

## 2024-10-10 RX ORDER — ACETAMINOPHEN 160 MG/5ML
650 SOLUTION ORAL EVERY 4 HOURS PRN
Status: DISCONTINUED | OUTPATIENT
Start: 2024-10-10 | End: 2024-10-16 | Stop reason: HOSPADM

## 2024-10-10 RX ORDER — PANTOPRAZOLE SODIUM 40 MG/1
40 TABLET, DELAYED RELEASE ORAL
Status: DISCONTINUED | OUTPATIENT
Start: 2024-10-11 | End: 2024-10-16 | Stop reason: HOSPADM

## 2024-10-10 RX ORDER — DOCUSATE SODIUM 100 MG/1
100 CAPSULE, LIQUID FILLED ORAL 2 TIMES DAILY
Status: DISCONTINUED | OUTPATIENT
Start: 2024-10-10 | End: 2024-10-16 | Stop reason: HOSPADM

## 2024-10-10 RX ORDER — ONDANSETRON HYDROCHLORIDE 2 MG/ML
4 INJECTION, SOLUTION INTRAVENOUS EVERY 8 HOURS PRN
Status: DISCONTINUED | OUTPATIENT
Start: 2024-10-10 | End: 2024-10-16 | Stop reason: HOSPADM

## 2024-10-10 RX ORDER — TALC
3 POWDER (GRAM) TOPICAL NIGHTLY
Status: DISCONTINUED | OUTPATIENT
Start: 2024-10-10 | End: 2024-10-16 | Stop reason: HOSPADM

## 2024-10-10 RX ORDER — ENOXAPARIN SODIUM 150 MG/ML
1 INJECTION SUBCUTANEOUS ONCE
Status: COMPLETED | OUTPATIENT
Start: 2024-10-10 | End: 2024-10-10

## 2024-10-10 RX ORDER — LIDOCAINE 560 MG/1
1 PATCH PERCUTANEOUS; TOPICAL; TRANSDERMAL ONCE
Status: COMPLETED | OUTPATIENT
Start: 2024-10-10 | End: 2024-10-11

## 2024-10-10 RX ORDER — ETOMIDATE 2 MG/ML
15 INJECTION INTRAVENOUS ONCE
Status: COMPLETED | OUTPATIENT
Start: 2024-10-10 | End: 2024-10-10

## 2024-10-10 RX ORDER — PANTOPRAZOLE SODIUM 40 MG/10ML
40 INJECTION, POWDER, LYOPHILIZED, FOR SOLUTION INTRAVENOUS
Status: DISCONTINUED | OUTPATIENT
Start: 2024-10-11 | End: 2024-10-16 | Stop reason: HOSPADM

## 2024-10-10 RX ORDER — GUAIFENESIN/DEXTROMETHORPHAN 100-10MG/5
5 SYRUP ORAL EVERY 4 HOURS PRN
Status: DISCONTINUED | OUTPATIENT
Start: 2024-10-10 | End: 2024-10-16 | Stop reason: HOSPADM

## 2024-10-10 RX ORDER — METOPROLOL TARTRATE 50 MG/1
50 TABLET ORAL ONCE
Status: COMPLETED | OUTPATIENT
Start: 2024-10-10 | End: 2024-10-10

## 2024-10-10 RX ORDER — POLYETHYLENE GLYCOL 3350 17 G/17G
17 POWDER, FOR SOLUTION ORAL DAILY
Status: DISCONTINUED | OUTPATIENT
Start: 2024-10-10 | End: 2024-10-16 | Stop reason: HOSPADM

## 2024-10-10 RX ORDER — ONDANSETRON 4 MG/1
4 TABLET, ORALLY DISINTEGRATING ORAL EVERY 8 HOURS PRN
Status: DISCONTINUED | OUTPATIENT
Start: 2024-10-10 | End: 2024-10-16 | Stop reason: HOSPADM

## 2024-10-10 RX ORDER — ONDANSETRON HYDROCHLORIDE 2 MG/ML
4 INJECTION, SOLUTION INTRAVENOUS ONCE
Status: COMPLETED | OUTPATIENT
Start: 2024-10-10 | End: 2024-10-10

## 2024-10-10 RX ORDER — ACETAMINOPHEN 650 MG/1
650 SUPPOSITORY RECTAL EVERY 4 HOURS PRN
Status: DISCONTINUED | OUTPATIENT
Start: 2024-10-10 | End: 2024-10-16 | Stop reason: HOSPADM

## 2024-10-10 RX ORDER — ACETAMINOPHEN 325 MG/1
650 TABLET ORAL EVERY 4 HOURS PRN
Status: DISCONTINUED | OUTPATIENT
Start: 2024-10-10 | End: 2024-10-16 | Stop reason: HOSPADM

## 2024-10-10 RX ORDER — GUAIFENESIN 600 MG/1
600 TABLET, EXTENDED RELEASE ORAL EVERY 12 HOURS PRN
Status: DISCONTINUED | OUTPATIENT
Start: 2024-10-10 | End: 2024-10-16 | Stop reason: HOSPADM

## 2024-10-10 ASSESSMENT — PAIN - FUNCTIONAL ASSESSMENT: PAIN_FUNCTIONAL_ASSESSMENT: 0-10

## 2024-10-10 ASSESSMENT — PAIN DESCRIPTION - PROGRESSION: CLINICAL_PROGRESSION: NOT CHANGED

## 2024-10-10 ASSESSMENT — PAIN DESCRIPTION - LOCATION: LOCATION: CHEST

## 2024-10-10 ASSESSMENT — PAIN SCALES - GENERAL
PAINLEVEL_OUTOF10: 10 - WORST POSSIBLE PAIN
PAINLEVEL_OUTOF10: 7

## 2024-10-10 ASSESSMENT — PAIN DESCRIPTION - PAIN TYPE: TYPE: ACUTE PAIN

## 2024-10-10 NOTE — ED PROVIDER NOTES
Handoff HPI: 44-year-old female with a history of hypothyroid secondary to thyroidectomy, obesity and family history of CAD and arrhythmias comes to the emergency department with a chief complaint of palpitations.  Handoff from the evening physician Dr. Muhammad was that the patient arrived in atrial flutter with rapid ventricular response.  He tried giving Cardizem that was unsuccessful and as the patient's symptoms had only been present for today and was not concerned about embolization performed a synchronized cardioversion under fentanyl and etomidate.  Synchronization was effective and the patient maintained pulses in the 90s.          Interval history:  Upon handoff, her pulse began to increase again.  Repeat ECG performed and cardiology consulted.  See ED course for further details    MDM:    ED Course as of 10/11/24 0754   Thu Oct 10, 2024   1630 VS notable for significantly tachycardic on presentation in setting of reported chest pain and fast HR, remaining VSS [BC]   1632 I personally reviewed and interpreted the EKG @1619: Aflutter w/, normal axis/intervals and no appreciable ischemia, and no prior EKG available for review. [BC]   1700 CBC and Auto Differential(!)  Leukocytosis with predominant neutrophilia and 3-1/2 g drop in hemoglobin compared to 1 year ago without report of hemorrhage in the setting of chest pain and tachycardia, no reported infectious symptoms but will treat for occult infection [BC]   1700 Comprehensive metabolic panel(!)  Unremarkable and noncontributory to Patient condition/symptoms [BC]   1700 TSH with reflex to Free T4 if abnormal(!)  Significantly elevated consistent with surgically acquired hypothyroidism and likely medication noncompliance [BC]   1700 Thyroxine, Free  Subclinical hypothyroidism [BC]   1700 Troponin I Series, High Sensitivity (0, 1 HR)(!)  Initial troponin WNL, delta of 31 likely consistent with recent electrocardioversion, EKG not demonstrating any  ischemic changes, extreme low sufficient for ACS/MI at this time [BC]   1700 Urinalysis with Reflex Microscopic(!)  Negative nitrates mild to moderate leuk esterase with mild pyuria, no immediate concern for UTI/cystitis [BC]   1800 XR chest 1 view  IMPRESSION:  1. No acute cardiopulmonary process.    I have personally reviewed and interpreted the images, no evidence of acute cardiopulmonary pathologies, no clinical evidence of cardiac silhouette WNL, no pulmonary vascular congestion or opacities, agree with radiology final read [BC]   1846 ECG performed October 10, 2024 at 1827 and interpreted by me at 1830 showing a sinus tachycardia with a ventricular rate of 103, no axis deviation, intervals within normal limits, no STEMI.  T wave inversions in inferior leads.  Similar to previous performed at 1638.  T wave inversions are similar, but on ECG performed at 1619 are different. [EG]   1917 Discussed the ED course, including medications and cardioversion with Dr. Pandey who is the cardiologist on-call.  He recommends Lovenox for anticoagulation and diltiazem should flutter or atrial fibrillation recur. [EG]   1929 Urinalysis with Reflex Microscopic(!)  Evidence of urinary tract infection with 250 leukocyte Estrace.  No bacteria on microscopic and this is more likely a contaminant.  Patient was meeting SIRS criteria on arrival in the emergency department and started on sepsis protocol.  She is given Zosyn which will cover for urinary tract infection.  [EG]   1930 Lactate(!)  Greater than 2 with concern for endorgan damage in sepsis although bacterial focus is not obvious as CT is negative for evidence of pneumonia.  Patient has received 2 L by normal saline which is appropriate for the patient's ideal weight 30 cc/kg.  She is morbidly obese with a BMI much greater than 30.  Zosyn was ordered by the previous physician of 3.375 mg which is just below the dosage for pneumonia.  As a bacterial focus cannot be fully ruled  out given questionable urinalysis, vancomycin will also be added to the antibiotic regimen. [EG]   1931 CT angio chest for pulmonary embolism      IMPRESSION:  No evidence of PE.      Left lung nodules measuring up to 6 mm. See below.      Probable diffuse fatty infiltration of the liver incidentally noted.       [EG]   1934 CBC and Auto Differential(!)  Remarkable for a leukocytosis with left shift.  Normocytic anemia not requiring emergent intervention at this time that is significantly below the patient's baseline from 1 year ago.  Thrombocytosis.  Patient was very tachycardic on arrival and hemoconcentration is possible which could also mean that the patient's hemoglobin is lower than 9.8.  Patient is being admitted by her primary care provider and will likely get repeat labs for comparison [EG]   1935 Troponin I Series, High Sensitivity (0, 1 HR)(!)  Initial troponin is negative with a positive delta.  Although most likely this is due to the recent cardioversion.  However non-ST elevation MI is part of the differential.  Case was discussed with cardiology and the patient will get a 1 mg/kg IM injection of Lovenox. [EG]   1937 TSH with reflex to Free T4 if abnormal(!)  Significantly elevated TSH, but normal free thyroxine.  Patient had stated that she is often noncompliant with her thyroid medication. [EG]   1937 hCG, quantitative, pregnancy  Negative pregnancy [EG]   1937 Comprehensive metabolic panel(!)  Not contributory in a nonfasting patient [EG]   1937 Drug Screen, Urine(!)  Positive for oxycodone.  Patient had a recent shoulder injury with a torn rotator cuff [EG]   1938 Patient was also complaining of right shoulder pain due to her recent rotator cuff tear.  She had already been treated with fentanyl for cardioversion.  NSAIDs are contraindicated as the patient may require anticoagulation.  She was given a dose of Dilaudid as well as a lidocaine patch and provided a sling that was placed by nursing with  "no neuro vascular compromise status post sling placement [EG]   1943 Given poor bed situation at [place], patient will stay at [facility] and may require catheterization, but [facility] has ordination for \"boomerang\" to the Cath Lab for stable non-STEMI's.  Patient's vital signs are now remaining stable.  He is maintained maps above 65.  Chest pain has resolved.  She continues to complain about pain in her right shoulder from the recent rotator cuff tear diagnosis. [EG]      ED Course User Index  [BC] Greg Muhammad MD  [EG] Tabitha Zepeda MD         Diagnoses as of 10/11/24 0754   Acute chest pain   Postoperative hypothyroidism   Noncompliance with medication regimen   Atrial flutter with rapid ventricular response (Multi)   Leukocytosis, unspecified type   Traumatic tear of right rotator cuff, unspecified tear extent, subsequent encounter   Acute electrocardiogram changes   Elevated troponin     35 minutes of critical care time were spent on re-examinations, close monitoring, data analysis and communication with patient/family/other providers and do not include time spent on procedures. Not concurrent with other providers     Tabitha Zepeda MD  10/10/24 1955       Tabitha Zepeda MD  10/11/24 0754    "

## 2024-10-10 NOTE — ED PROVIDER NOTES
"HPI   Chief Complaint   Patient presents with    Chest Pain     Pt bib EMS from home for tachycardia with chest pain. Pt states it started a few hours ago, her heart started to race, she felt dizzy and she vomited x1. Pt with a heart rate in the 150s per EMS, pt stating pain 10/10 in upper center of her chest.       Patient resents the ED by EMS for concern of right sided parasternal and inframammary chest pain along with palpitations and feeling her heart racing.  She notes symptoms began about 2 to 3 hours PTA.  Does not endorse any significant shortness of breath but feels generalized fatigue and weakness at this time.  Notes no bilateral/unilateral leg swelling and notes no history of DVT/PE.  Does endorse any concerning red flags for DVT/PE.  Notes significant history in immediate family for early ischemic cardiomyopathy events.  Notes no recent infectious symptoms and has not appreciating fever/chills.  Notes she is questionably compliant with her medications but states she may have missed doses of her Synthroid and other medications.  Presently denies an cough or hemoptysis.  Did experience 1 episode of nonbloody/nonbilious emesis associated with her symptoms.  Denies any other GI/ symptoms.  Denies any significant substance use.  EMS notes patient's HR has been consistently in the 150s and she has remained appropriately normotensive and mentating well.      History provided by:  EMS personnel          Patient History   No past medical history on file.  No past surgical history on file.  No family history on file.  Social History     Tobacco Use    Smoking status: Never    Smokeless tobacco: Never   Substance Use Topics    Alcohol use: Never    Drug use: Never       Physical Exam   BP (!) 130/104   Pulse (!) 119   Temp 36.7 °C (98 °F) (Tympanic)   Resp (!) 24   Ht 1.676 m (5' 6\")   Wt 137 kg (301 lb 2.4 oz)   LMP 09/29/2024   SpO2 100%   BMI 48.61 kg/m²       Physical Exam  Vitals and nursing note " reviewed.   Constitutional:       General: She is in acute distress.      Appearance: Normal appearance. She is well-developed. She is obese. She is not ill-appearing or toxic-appearing.      Comments: Patient appears moderately distressed due to symptoms and concern of family history   HENT:      Mouth/Throat:      Mouth: Mucous membranes are moist.      Pharynx: Oropharynx is clear.   Eyes:      Extraocular Movements: Extraocular movements intact.      Pupils: Pupils are equal, round, and reactive to light.   Cardiovascular:      Rate and Rhythm: Regular rhythm. Tachycardia present.      Pulses: Normal pulses.           Radial pulses are 2+ on the right side and 2+ on the left side.        Dorsalis pedis pulses are 2+ on the right side and 2+ on the left side.      Heart sounds: Normal heart sounds. Heart sounds not distant. No murmur heard.     Comments: Equal and symmetrical distal pulses  Pulmonary:      Effort: Pulmonary effort is normal. No respiratory distress.      Breath sounds: Normal breath sounds. No decreased air movement. No decreased breath sounds.      Comments: Speaking in complete sentences, no conversational dyspnea, no requiring supplemental O2  Chest:      Chest wall: No tenderness.   Musculoskeletal:      Right lower leg: No edema.      Left lower leg: No edema.   Skin:     General: Skin is warm and dry.      Coloration: Skin is not ashen, cyanotic or pale.   Neurological:      General: No focal deficit present.      Mental Status: She is alert and oriented to person, place, and time.           ED Course & MDM   ED Course as of 10/10/24 2020   Thu Oct 10, 2024   1630 VS notable for significantly tachycardic on presentation in setting of reported chest pain and fast HR, remaining VSS [BC]   1632 I personally reviewed and interpreted the EKG @1619: Aflutter w/, normal axis/intervals and no appreciable ischemia, and no prior EKG available for review. [BC]   1700 CBC and Auto  Differential(!)  Leukocytosis with predominant neutrophilia and 3-1/2 g drop in hemoglobin compared to 1 year ago without report of hemorrhage in the setting of chest pain and tachycardia, no reported infectious symptoms but will treat for occult infection [BC]   1700 Comprehensive metabolic panel(!)  Unremarkable and noncontributory to Patient condition/symptoms [BC]   1700 TSH with reflex to Free T4 if abnormal(!)  Significantly elevated consistent with surgically acquired hypothyroidism and likely medication noncompliance [BC]   1700 Thyroxine, Free  Subclinical hypothyroidism [BC]   1700 Troponin I Series, High Sensitivity (0, 1 HR)(!)  Initial troponin WNL, delta of 31 likely consistent with recent electrocardioversion, EKG not demonstrating any ischemic changes, extreme low sufficient for ACS/MI at this time [BC]   1700 Urinalysis with Reflex Microscopic(!)  Negative nitrates mild to moderate leuk esterase with mild pyuria, no immediate concern for UTI/cystitis [BC]   1800 XR chest 1 view  IMPRESSION:  1. No acute cardiopulmonary process.    I have personally reviewed and interpreted the images, no evidence of acute cardiopulmonary pathologies, no clinical evidence of cardiac silhouette WNL, no pulmonary vascular congestion or opacities, agree with radiology final read [BC]   1846 ECG performed October 10, 2024 at 1827 and interpreted by me at 1830 showing a sinus tachycardia with a ventricular rate of 103, no axis deviation, intervals within normal limits, no STEMI.  T wave inversions in inferior leads.  Similar to previous performed at 1638.  T wave inversions are similar, but on ECG performed at 1619 are different. [EG]   1917 Discussed the ED course, including medications and cardioversion with Dr. Pandey who is the cardiologist on-call.  He recommends Lovenox for anticoagulation and diltiazem should flutter or atrial fibrillation recur. [EG]   1929 Urinalysis with Reflex Microscopic(!)  Evidence of urinary  tract infection with 250 leukocyte Estrace.  No bacteria on microscopic and this is more likely a contaminant.  Patient was meeting SIRS criteria on arrival in the emergency department and started on sepsis protocol.  She is given Zosyn which will cover for urinary tract infection.  [EG]   1930 Lactate(!)  Greater than 2 with concern for endorgan damage in sepsis although bacterial focus is not obvious as CT is negative for evidence of pneumonia.  Patient has received 2 L by normal saline which is appropriate for the patient's ideal weight 30 cc/kg.  She is morbidly obese with a BMI much greater than 30.  Zosyn was ordered by the previous physician of 3.375 mg which is just below the dosage for pneumonia.  As a bacterial focus cannot be fully ruled out given questionable urinalysis, vancomycin will also be added to the antibiotic regimen. [EG]   1931 CT angio chest for pulmonary embolism      IMPRESSION:  No evidence of PE.      Left lung nodules measuring up to 6 mm. See below.      Probable diffuse fatty infiltration of the liver incidentally noted.       [EG]   1934 CBC and Auto Differential(!)  Remarkable for a leukocytosis with left shift.  Normocytic anemia not requiring emergent intervention at this time that is significantly below the patient's baseline from 1 year ago.  Thrombocytosis.  Patient was very tachycardic on arrival and hemoconcentration is possible which could also mean that the patient's hemoglobin is lower than 9.8.  Patient is being admitted by her primary care provider and will likely get repeat labs for comparison [EG]   1935 Troponin I Series, High Sensitivity (0, 1 HR)(!)  Initial troponin is negative with a positive delta.  Although most likely this is due to the recent cardioversion.  However non-ST elevation MI is part of the differential.  Case was discussed with cardiology and the patient will get a 1 mg/kg IM injection of Lovenox. [EG]   1937 TSH with reflex to Free T4 if  "abnormal(!)  Significantly elevated TSH, but normal free thyroxine.  Patient had stated that she is often noncompliant with her thyroid medication. [EG]   1937 hCG, quantitative, pregnancy  Negative pregnancy [EG]   1937 Comprehensive metabolic panel(!)  Not contributory in a nonfasting patient [EG]   1937 Drug Screen, Urine(!)  Positive for oxycodone.  Patient had a recent shoulder injury with a torn rotator cuff [EG]   1938 Patient was also complaining of right shoulder pain due to her recent rotator cuff tear.  She had already been treated with fentanyl for cardioversion.  NSAIDs are contraindicated as the patient may require anticoagulation.  She was given a dose of Dilaudid as well as a lidocaine patch and provided a sling that was placed by nursing with no neuro vascular compromise status post sling placement [EG]   1943 Given poor bed situation at [place], patient will stay at [facility] and may require catheterization, but [facility] has ordination for \"boomerang\" to the Cath Lab for stable non-STEMI's.  Patient's vital signs are now remaining stable.  He is maintained maps above 65.  Chest pain has resolved.  She continues to complain about pain in her right shoulder from the recent rotator cuff tear diagnosis. [EG]      ED Course User Index  [BC] Greg Muhammad MD  [EG] Tabitha Zepeda MD         Diagnoses as of 10/10/24 2020   Acute chest pain   Postoperative hypothyroidism   Noncompliance with medication regimen   Atrial flutter with rapid ventricular response (Multi)   Leukocytosis, unspecified type   Traumatic tear of right rotator cuff, unspecified tear extent, subsequent encounter   Acute electrocardiogram changes   Elevated troponin                 No data recorded     [place] Coma Scale Score: 15 (10/10/24 1705 : Jazmin Houston RN)                           Medical Decision Making  Patient presented to the ED for retrosternal/right sided parasternal and inframammary chest pain 2 to 3 hours " "PTA along with palpitations and rapid HR with concerning PMHx of HLD, hypothyroidism, anemia, poly-psych  .  I personally reviewed and interpreted VS, labs, images, and EKG which are as stated above in the ED course.    Assessment/evaluation multifactorial and concerning for very early ACS with concern of occult infectious process and potential myocarditis given severely elevated HR and significantly elevated leukocytosis, rhythm concerning for A-fib 2:1 block versus SVT.  No concerning history, clinical evidence/work-up, or exam findings for the considered differentials of low suspicion for PE pending imaging read, PTX, significant electrolyte/metabolic derangement, sympathomimetic substance use, medication induced thyrotoxicosis/thyroid storm.  These conditions have been thoroughly evaluated and determined to be sufficiently unlikely to be the etiology of patient's presenting symptoms.    Performed bedside consent for electrical cardioversion of unstable a flutter versus SVT, patient was appropriately oriented and demonstrating appropriate capacity to make sound medical decisions for herself.  Discussed risk factors of moderate sedation to include depressed respiratory effort requiring supplemental support up to the point of intubation along with potential for conversion to malignant cardiac arrhythmia that is nonlife sustaining requiring further advanced interventions and electrical shocks supplemented by cardiopulmonary resuscitative efforts and further medication administration.  Patient understood benefits and risks without question and provided complete consent for procedure.      Patient signed out to oncoming provider, Dr. Tabitha Jones, at 8:16 PM in stable but acute condition.    BP (!) 130/104   Pulse (!) 119   Temp 36.7 °C (98 °F) (Tympanic)   Resp (!) 24   Ht 1.676 m (5' 6\")   Wt 137 kg (301 lb 2.4 oz)   LMP 09/29/2024   SpO2 100%   BMI 48.61 kg/m²     Remaining workup:  Images CT PE " and Reassessment    Patient disposition Medicine Admission and alternative disposition NONE.      Per Chart Review: Nothing (ED encounter.    Critical Care Time: 38 minutes excluding time spend performing procedures, treating other patients, and teaching time.    Critical Concern / Vital Organ System affected: Cardiovascular collapse secondary to high-output cardiac failure resulting in acute onset hypoperfusion secondary to hypotension, developing nonlife sustaining malignant cardiac arrhythmia   /   cardiac.    Critcal Intervention(s): Continue telemetry, attempted rate control with meds, fluid resuscitation, appropriate study acquisition and interpretation, elected to perform electrical cardioversion given transition to unstable atrial flutter versus SVT, successful cardioversion during moderate sedation, initiated IV antibiotics with concern for occult infectious process.        Parts of this chart have been completed using voice-to-text recognition software. Please excuse any errors of transcription that were missed for editing/correcting.    Problems Addressed:  Acute chest pain: acute illness or injury  Atrial flutter with rapid ventricular response (Multi): undiagnosed new problem with uncertain prognosis    Amount and/or Complexity of Data Reviewed  Independent Historian: EMS  Labs: ordered. Decision-making details documented in ED Course.  Radiology: ordered and independent interpretation performed. Decision-making details documented in ED Course.  ECG/medicine tests: ordered and independent interpretation performed. Decision-making details documented in ED Course.        Procedure  Electrical Cardioversion    Performed by: Greg Muhammad MD  Authorized by: Greg Muhammad MD    Consent:     Consent obtained:  Verbal    Consent given by:  Patient    Risks, benefits, and alternatives were discussed: yes      Risks discussed:  Cutaneous burn, death, induced arrhythmia and pain    Alternatives  discussed:  No treatment, rate-control medication, alternative treatment and delayed treatment  Universal protocol:     Procedure explained and questions answered to patient or proxy's satisfaction: yes      Relevant documents present and verified: no      Test results available: yes      Imaging studies available: yes      Required blood products, implants, devices, and special equipment available: yes      Site/side marked: no      Immediately prior to procedure, a time out was called: yes      Patient identity confirmed:  Verbally with patient, arm band and hospital-assigned identification number  Pre-procedure details:     Cardioversion basis:  Emergent    Rhythm:  Atrial flutter    Electrode placement:  Anterior-lateral  Attempt one:     Cardioversion mode:  Synchronous    Waveform:  Biphasic    Shock (Joules):  200    Shock outcome:  Conversion to normal sinus rhythm  Post-procedure details:     Patient status:  Alert    Procedure completion:  Tolerated  Comments:      Patient endorsed mild pain post cardioversion without any significant new symptoms or complaints, did experience mild nausea that appropriately treated and managed, initial rhythm sinus tach 110-120 with gradual downtrend to NSR 90       Greg Muhammad MD  10/10/24 2020

## 2024-10-10 NOTE — SIGNIFICANT EVENT
10/10/24 1700 10/10/24 1715   Medical Gas Therapy/Pulse Ox   Medical Gas Therapy Supplemental oxygen Supplemental oxygen   Medical Gas Delivery Method Nasal cannula  (4l) Nasal cannula  (4l)   Pulse Ox 99 % 100 %   Patient Activity During SpO2 Measurement At rest At rest   Pulse Oximetry Type Continuous Continuous   ETCO2 (mmHg) 23 mmHg 21 mmHg     Patient cardioversion in ED. No respiratory complications.

## 2024-10-11 ENCOUNTER — APPOINTMENT (OUTPATIENT)
Dept: RADIOLOGY | Facility: HOSPITAL | Age: 44
End: 2024-10-11
Payer: COMMERCIAL

## 2024-10-11 ENCOUNTER — APPOINTMENT (OUTPATIENT)
Dept: CARDIOLOGY | Facility: HOSPITAL | Age: 44
End: 2024-10-11
Payer: COMMERCIAL

## 2024-10-11 PROBLEM — N92.0 MENORRHAGIA WITH REGULAR CYCLE: Status: ACTIVE | Noted: 2024-10-11

## 2024-10-11 LAB
ALBUMIN SERPL BCP-MCNC: 3.7 G/DL (ref 3.4–5)
ALP SERPL-CCNC: 48 U/L (ref 33–110)
ALT SERPL W P-5'-P-CCNC: 17 U/L (ref 7–45)
ANION GAP SERPL CALCULATED.3IONS-SCNC: 14 MMOL/L (ref 10–20)
AST SERPL W P-5'-P-CCNC: 20 U/L (ref 9–39)
ATRIAL RATE: 124 BPM
ATRIAL RATE: 141 BPM
BILIRUB SERPL-MCNC: 0.2 MG/DL (ref 0–1.2)
BUN SERPL-MCNC: 37 MG/DL (ref 6–23)
CALCIUM SERPL-MCNC: 8.2 MG/DL (ref 8.6–10.3)
CARDIAC TROPONIN I PNL SERPL HS: 30 NG/L (ref 0–13)
CHLORIDE SERPL-SCNC: 105 MMOL/L (ref 98–107)
CO2 SERPL-SCNC: 23 MMOL/L (ref 21–32)
CREAT SERPL-MCNC: 0.93 MG/DL (ref 0.5–1.05)
EGFRCR SERPLBLD CKD-EPI 2021: 79 ML/MIN/1.73M*2
ERYTHROCYTE [DISTWIDTH] IN BLOOD BY AUTOMATED COUNT: 15.6 % (ref 11.5–14.5)
GLUCOSE SERPL-MCNC: 129 MG/DL (ref 74–99)
HCT VFR BLD AUTO: 25.9 % (ref 36–46)
HGB BLD-MCNC: 8.1 G/DL (ref 12–16)
MCH RBC QN AUTO: 29.1 PG (ref 26–34)
MCHC RBC AUTO-ENTMCNC: 31.3 G/DL (ref 32–36)
MCV RBC AUTO: 93 FL (ref 80–100)
NRBC BLD-RTO: 0 /100 WBCS (ref 0–0)
P AXIS: 41 DEGREES
P AXIS: 43 DEGREES
P OFFSET: 199 MS
P OFFSET: 204 MS
P ONSET: 150 MS
P ONSET: 160 MS
PLATELET # BLD AUTO: 391 X10*3/UL (ref 150–450)
POTASSIUM SERPL-SCNC: 3.7 MMOL/L (ref 3.5–5.3)
PR INTERVAL: 122 MS
PR INTERVAL: 146 MS
PROT SERPL-MCNC: 6.4 G/DL (ref 6.4–8.2)
Q ONSET: 221 MS
Q ONSET: 223 MS
QRS COUNT: 20 BEATS
QRS COUNT: 24 BEATS
QRS DURATION: 68 MS
QRS DURATION: 70 MS
QT INTERVAL: 284 MS
QT INTERVAL: 308 MS
QTC CALCULATION(BAZETT): 434 MS
QTC CALCULATION(BAZETT): 442 MS
QTC FREDERICIA: 377 MS
QTC FREDERICIA: 392 MS
R AXIS: 19 DEGREES
R AXIS: 20 DEGREES
RBC # BLD AUTO: 2.78 X10*6/UL (ref 4–5.2)
SODIUM SERPL-SCNC: 138 MMOL/L (ref 136–145)
T AXIS: 100 DEGREES
T AXIS: 45 DEGREES
T OFFSET: 363 MS
T OFFSET: 377 MS
VENTRICULAR RATE: 124 BPM
VENTRICULAR RATE: 141 BPM
WBC # BLD AUTO: 17.1 X10*3/UL (ref 4.4–11.3)

## 2024-10-11 PROCEDURE — 2500000004 HC RX 250 GENERAL PHARMACY W/ HCPCS (ALT 636 FOR OP/ED): Performed by: INTERNAL MEDICINE

## 2024-10-11 PROCEDURE — 2500000001 HC RX 250 WO HCPCS SELF ADMINISTERED DRUGS (ALT 637 FOR MEDICARE OP): Performed by: INTERNAL MEDICINE

## 2024-10-11 PROCEDURE — 2060000001 HC INTERMEDIATE ICU ROOM DAILY

## 2024-10-11 PROCEDURE — 80053 COMPREHEN METABOLIC PANEL: CPT | Performed by: INTERNAL MEDICINE

## 2024-10-11 PROCEDURE — 3430000001 HC RX 343 DIAGNOSTIC RADIOPHARMACEUTICALS: Performed by: INTERNAL MEDICINE

## 2024-10-11 PROCEDURE — 99255 IP/OBS CONSLTJ NEW/EST HI 80: CPT | Performed by: INTERNAL MEDICINE

## 2024-10-11 PROCEDURE — A9502 TC99M TETROFOSMIN: HCPCS | Performed by: INTERNAL MEDICINE

## 2024-10-11 PROCEDURE — 93306 TTE W/DOPPLER COMPLETE: CPT | Performed by: INTERNAL MEDICINE

## 2024-10-11 PROCEDURE — 99222 1ST HOSP IP/OBS MODERATE 55: CPT | Performed by: INTERNAL MEDICINE

## 2024-10-11 PROCEDURE — 97166 OT EVAL MOD COMPLEX 45 MIN: CPT | Mod: GO

## 2024-10-11 PROCEDURE — 93005 ELECTROCARDIOGRAM TRACING: CPT

## 2024-10-11 PROCEDURE — 78452 HT MUSCLE IMAGE SPECT MULT: CPT

## 2024-10-11 PROCEDURE — 97530 THERAPEUTIC ACTIVITIES: CPT | Mod: GP

## 2024-10-11 PROCEDURE — 93306 TTE W/DOPPLER COMPLETE: CPT

## 2024-10-11 PROCEDURE — 84484 ASSAY OF TROPONIN QUANT: CPT | Performed by: INTERNAL MEDICINE

## 2024-10-11 PROCEDURE — 97162 PT EVAL MOD COMPLEX 30 MIN: CPT | Mod: GP

## 2024-10-11 PROCEDURE — 85027 COMPLETE CBC AUTOMATED: CPT | Performed by: INTERNAL MEDICINE

## 2024-10-11 PROCEDURE — 36415 COLL VENOUS BLD VENIPUNCTURE: CPT | Performed by: INTERNAL MEDICINE

## 2024-10-11 PROCEDURE — 2500000002 HC RX 250 W HCPCS SELF ADMINISTERED DRUGS (ALT 637 FOR MEDICARE OP, ALT 636 FOR OP/ED): Performed by: INTERNAL MEDICINE

## 2024-10-11 PROCEDURE — 93017 CV STRESS TEST TRACING ONLY: CPT

## 2024-10-11 RX ORDER — METHOCARBAMOL 500 MG/1
750 TABLET, FILM COATED ORAL 2 TIMES DAILY
Status: DISCONTINUED | OUTPATIENT
Start: 2024-10-11 | End: 2024-10-16 | Stop reason: HOSPADM

## 2024-10-11 RX ORDER — CYCLOBENZAPRINE HCL 10 MG
10 TABLET ORAL 3 TIMES DAILY PRN
Status: DISCONTINUED | OUTPATIENT
Start: 2024-10-11 | End: 2024-10-16 | Stop reason: HOSPADM

## 2024-10-11 RX ORDER — CHLORHEXIDINE GLUCONATE ORAL RINSE 1.2 MG/ML
15 SOLUTION DENTAL 2 TIMES DAILY
Status: DISCONTINUED | OUTPATIENT
Start: 2024-10-11 | End: 2024-10-16 | Stop reason: HOSPADM

## 2024-10-11 RX ORDER — AMINOPHYLLINE 25 MG/ML
125 INJECTION, SOLUTION INTRAVENOUS AS NEEDED
Status: CANCELLED | OUTPATIENT
Start: 2024-10-11

## 2024-10-11 RX ORDER — METOPROLOL SUCCINATE 100 MG/1
100 TABLET, EXTENDED RELEASE ORAL DAILY
Status: DISCONTINUED | OUTPATIENT
Start: 2024-10-11 | End: 2024-10-16 | Stop reason: HOSPADM

## 2024-10-11 RX ORDER — HYDROCORTISONE 25 MG/G
CREAM TOPICAL 2 TIMES DAILY PRN
Status: DISCONTINUED | OUTPATIENT
Start: 2024-10-11 | End: 2024-10-16 | Stop reason: HOSPADM

## 2024-10-11 RX ORDER — TRAMADOL HYDROCHLORIDE 50 MG/1
50 TABLET ORAL EVERY 6 HOURS PRN
Status: DISCONTINUED | OUTPATIENT
Start: 2024-10-11 | End: 2024-10-16 | Stop reason: HOSPADM

## 2024-10-11 RX ORDER — ATORVASTATIN CALCIUM 40 MG/1
40 TABLET, FILM COATED ORAL NIGHTLY
Status: DISCONTINUED | OUTPATIENT
Start: 2024-10-11 | End: 2024-10-16 | Stop reason: HOSPADM

## 2024-10-11 RX ORDER — LEVOTHYROXINE SODIUM 100 UG/1
200 TABLET ORAL
Status: DISCONTINUED | OUTPATIENT
Start: 2024-10-11 | End: 2024-10-16 | Stop reason: HOSPADM

## 2024-10-11 RX ORDER — VALACYCLOVIR HYDROCHLORIDE 500 MG/1
1000 TABLET, FILM COATED ORAL 3 TIMES DAILY
Status: DISCONTINUED | OUTPATIENT
Start: 2024-10-11 | End: 2024-10-15

## 2024-10-11 RX ORDER — REGADENOSON 0.08 MG/ML
0.4 INJECTION, SOLUTION INTRAVENOUS
Status: COMPLETED | OUTPATIENT
Start: 2024-10-11 | End: 2024-10-11

## 2024-10-11 RX ORDER — NAPROXEN 250 MG/1
500 TABLET ORAL
Status: DISCONTINUED | OUTPATIENT
Start: 2024-10-11 | End: 2024-10-11

## 2024-10-11 SDOH — ECONOMIC STABILITY: INCOME INSECURITY: HOW HARD IS IT FOR YOU TO PAY FOR THE VERY BASICS LIKE FOOD, HOUSING, MEDICAL CARE, AND HEATING?: SOMEWHAT HARD

## 2024-10-11 SDOH — ECONOMIC STABILITY: INCOME INSECURITY: IN THE PAST 12 MONTHS HAS THE ELECTRIC, GAS, OIL, OR WATER COMPANY THREATENED TO SHUT OFF SERVICES IN YOUR HOME?: NO

## 2024-10-11 SDOH — ECONOMIC STABILITY: FOOD INSECURITY: HOW HARD IS IT FOR YOU TO PAY FOR THE VERY BASICS LIKE FOOD, HOUSING, MEDICAL CARE, AND HEATING?: NOT VERY HARD

## 2024-10-11 SDOH — SOCIAL STABILITY: SOCIAL INSECURITY: WITHIN THE LAST YEAR, HAVE YOU BEEN HUMILIATED OR EMOTIONALLY ABUSED IN OTHER WAYS BY YOUR PARTNER OR EX-PARTNER?: NO

## 2024-10-11 SDOH — SOCIAL STABILITY: SOCIAL INSECURITY
WITHIN THE LAST YEAR, HAVE YOU BEEN KICKED, HIT, SLAPPED, OR OTHERWISE PHYSICALLY HURT BY YOUR PARTNER OR EX-PARTNER?: NO

## 2024-10-11 SDOH — HEALTH STABILITY: MENTAL HEALTH: HOW MANY STANDARD DRINKS CONTAINING ALCOHOL DO YOU HAVE ON A TYPICAL DAY?: PATIENT DOES NOT DRINK

## 2024-10-11 SDOH — ECONOMIC STABILITY: FOOD INSECURITY: WITHIN THE PAST 12 MONTHS, THE FOOD YOU BOUGHT JUST DIDN'T LAST AND YOU DIDN'T HAVE MONEY TO GET MORE.: NEVER TRUE

## 2024-10-11 SDOH — ECONOMIC STABILITY: FOOD INSECURITY: WITHIN THE PAST 12 MONTHS, YOU WORRIED THAT YOUR FOOD WOULD RUN OUT BEFORE YOU GOT MONEY TO BUY MORE.: NEVER TRUE

## 2024-10-11 SDOH — HEALTH STABILITY: PHYSICAL HEALTH: ON AVERAGE, HOW MANY MINUTES DO YOU ENGAGE IN EXERCISE AT THIS LEVEL?: 40 MIN

## 2024-10-11 SDOH — ECONOMIC STABILITY: HOUSING INSECURITY: IN THE PAST 12 MONTHS, HOW MANY TIMES HAVE YOU MOVED WHERE YOU WERE LIVING?: 0

## 2024-10-11 SDOH — SOCIAL STABILITY: SOCIAL INSECURITY
WITHIN THE LAST YEAR, HAVE TO BEEN RAPED OR FORCED TO HAVE ANY KIND OF SEXUAL ACTIVITY BY YOUR PARTNER OR EX-PARTNER?: NO

## 2024-10-11 SDOH — SOCIAL STABILITY: SOCIAL INSECURITY: WITHIN THE LAST YEAR, HAVE YOU BEEN AFRAID OF YOUR PARTNER OR EX-PARTNER?: NO

## 2024-10-11 SDOH — HEALTH STABILITY: MENTAL HEALTH: HOW OFTEN DO YOU HAVE A DRINK CONTAINING ALCOHOL?: NEVER

## 2024-10-11 SDOH — ECONOMIC STABILITY: INCOME INSECURITY: IN THE LAST 12 MONTHS, WAS THERE A TIME WHEN YOU WERE NOT ABLE TO PAY THE MORTGAGE OR RENT ON TIME?: NO

## 2024-10-11 SDOH — SOCIAL STABILITY: SOCIAL INSECURITY: ARE YOU MARRIED, WIDOWED, DIVORCED, SEPARATED, NEVER MARRIED, OR LIVING WITH A PARTNER?: NEVER MARRIED

## 2024-10-11 SDOH — HEALTH STABILITY: MENTAL HEALTH: HOW OFTEN DO YOU HAVE SIX OR MORE DRINKS ON ONE OCCASION?: NEVER

## 2024-10-11 SDOH — ECONOMIC STABILITY: INCOME INSECURITY: HOW HARD IS IT FOR YOU TO PAY FOR THE VERY BASICS LIKE FOOD, HOUSING, MEDICAL CARE, AND HEATING?: NOT VERY HARD

## 2024-10-11 SDOH — HEALTH STABILITY: MENTAL HEALTH
STRESS IS WHEN SOMEONE FEELS TENSE, NERVOUS, ANXIOUS, OR CAN'T SLEEP AT NIGHT BECAUSE THEIR MIND IS TROUBLED. HOW STRESSED ARE YOU?: NOT AT ALL

## 2024-10-11 SDOH — ECONOMIC STABILITY: HOUSING INSECURITY: AT ANY TIME IN THE PAST 12 MONTHS, WERE YOU HOMELESS OR LIVING IN A SHELTER (INCLUDING NOW)?: NO

## 2024-10-11 SDOH — ECONOMIC STABILITY: INCOME INSECURITY: IN THE PAST 12 MONTHS, HAS THE ELECTRIC, GAS, OIL, OR WATER COMPANY THREATENED TO SHUT OFF SERVICE IN YOUR HOME?: NO

## 2024-10-11 SDOH — ECONOMIC STABILITY: HOUSING INSECURITY: IN THE LAST 12 MONTHS, WAS THERE A TIME WHEN YOU WERE NOT ABLE TO PAY THE MORTGAGE OR RENT ON TIME?: NO

## 2024-10-11 SDOH — HEALTH STABILITY: MENTAL HEALTH
HOW OFTEN DO YOU NEED TO HAVE SOMEONE HELP YOU WHEN YOU READ INSTRUCTIONS, PAMPHLETS, OR OTHER WRITTEN MATERIAL FROM YOUR DOCTOR OR PHARMACY?: RARELY

## 2024-10-11 SDOH — SOCIAL STABILITY: SOCIAL NETWORK: HOW OFTEN DO YOU ATTEND CHURCH OR RELIGIOUS SERVICES?: MORE THAN 4 TIMES PER YEAR

## 2024-10-11 SDOH — ECONOMIC STABILITY: FOOD INSECURITY: HOW HARD IS IT FOR YOU TO PAY FOR THE VERY BASICS LIKE FOOD, HOUSING, MEDICAL CARE, AND HEATING?: SOMEWHAT HARD

## 2024-10-11 SDOH — SOCIAL STABILITY: SOCIAL INSECURITY: ARE YOU OR HAVE YOU BEEN THREATENED OR ABUSED PHYSICALLY, EMOTIONALLY, OR SEXUALLY BY ANYONE?: NO

## 2024-10-11 SDOH — ECONOMIC STABILITY: FOOD INSECURITY: WITHIN THE PAST 12 MONTHS, YOU WORRIED THAT YOUR FOOD WOULD RUN OUT BEFORE YOU GOT THE MONEY TO BUY MORE.: NEVER TRUE

## 2024-10-11 SDOH — ECONOMIC STABILITY: TRANSPORTATION INSECURITY
IN THE PAST 12 MONTHS, HAS LACK OF TRANSPORTATION KEPT YOU FROM MEETINGS, WORK, OR FROM GETTING THINGS NEEDED FOR DAILY LIVING?: NO

## 2024-10-11 SDOH — HEALTH STABILITY: MENTAL HEALTH
HOW OFTEN DO YOU NEED TO HAVE SOMEONE HELP YOU WHEN YOU READ INSTRUCTIONS, PAMPHLETS, OR OTHER WRITTEN MATERIAL FROM YOUR DOCTOR OR PHARMACY?: NEVER

## 2024-10-11 SDOH — HEALTH STABILITY: MENTAL HEALTH
DO YOU FEEL STRESS - TENSE, RESTLESS, NERVOUS, OR ANXIOUS, OR UNABLE TO SLEEP AT NIGHT BECAUSE YOUR MIND IS TROUBLED ALL THE TIME - THESE DAYS?: NOT AT ALL

## 2024-10-11 SDOH — SOCIAL STABILITY: SOCIAL INSECURITY: HAVE YOU HAD ANY THOUGHTS OF HARMING ANYONE ELSE?: NO

## 2024-10-11 SDOH — SOCIAL STABILITY: SOCIAL INSECURITY
WITHIN THE LAST YEAR, HAVE YOU BEEN RAPED OR FORCED TO HAVE ANY KIND OF SEXUAL ACTIVITY BY YOUR PARTNER OR EX-PARTNER?: NO

## 2024-10-11 SDOH — SOCIAL STABILITY: SOCIAL NETWORK: HOW OFTEN DO YOU GET TOGETHER WITH FRIENDS OR RELATIVES?: TWICE A WEEK

## 2024-10-11 SDOH — HEALTH STABILITY: MENTAL HEALTH: HOW OFTEN DO YOU HAVE 6 OR MORE DRINKS ON ONE OCCASION?: NEVER

## 2024-10-11 SDOH — HEALTH STABILITY: PHYSICAL HEALTH: ON AVERAGE, HOW MANY DAYS PER WEEK DO YOU ENGAGE IN MODERATE TO STRENUOUS EXERCISE (LIKE A BRISK WALK)?: 5 DAYS

## 2024-10-11 SDOH — SOCIAL STABILITY: SOCIAL NETWORK: HOW OFTEN DO YOU ATTEND MEETINGS OF THE CLUBS OR ORGANIZATIONS YOU BELONG TO?: MORE THAN 4 TIMES PER YEAR

## 2024-10-11 SDOH — SOCIAL STABILITY: SOCIAL NETWORK
DO YOU BELONG TO ANY CLUBS OR ORGANIZATIONS SUCH AS CHURCH GROUPS, UNIONS, FRATERNAL OR ATHLETIC GROUPS, OR SCHOOL GROUPS?: YES

## 2024-10-11 SDOH — SOCIAL STABILITY: SOCIAL INSECURITY: HAS ANYONE EVER THREATENED TO HURT YOUR FAMILY OR YOUR PETS?: NO

## 2024-10-11 SDOH — ECONOMIC STABILITY: TRANSPORTATION INSECURITY
IN THE PAST 12 MONTHS, HAS THE LACK OF TRANSPORTATION KEPT YOU FROM MEDICAL APPOINTMENTS OR FROM GETTING MEDICATIONS?: NO

## 2024-10-11 SDOH — ECONOMIC STABILITY: TRANSPORTATION INSECURITY: IN THE PAST 12 MONTHS, HAS LACK OF TRANSPORTATION KEPT YOU FROM MEDICAL APPOINTMENTS OR FROM GETTING MEDICATIONS?: NO

## 2024-10-11 SDOH — SOCIAL STABILITY: SOCIAL NETWORK: ARE YOU MARRIED, WIDOWED, DIVORCED, SEPARATED, NEVER MARRIED, OR LIVING WITH A PARTNER?: NEVER MARRIED

## 2024-10-11 SDOH — SOCIAL STABILITY: SOCIAL NETWORK
IN A TYPICAL WEEK, HOW MANY TIMES DO YOU TALK ON THE PHONE WITH FAMILY, FRIENDS, OR NEIGHBORS?: MORE THAN THREE TIMES A WEEK

## 2024-10-11 SDOH — SOCIAL STABILITY: SOCIAL INSECURITY: ARE THERE ANY APPARENT SIGNS OF INJURIES/BEHAVIORS THAT COULD BE RELATED TO ABUSE/NEGLECT?: NO

## 2024-10-11 SDOH — SOCIAL STABILITY: SOCIAL INSECURITY: DOES ANYONE TRY TO KEEP YOU FROM HAVING/CONTACTING OTHER FRIENDS OR DOING THINGS OUTSIDE YOUR HOME?: NO

## 2024-10-11 SDOH — HEALTH STABILITY: MENTAL HEALTH: HOW MANY DRINKS CONTAINING ALCOHOL DO YOU HAVE ON A TYPICAL DAY WHEN YOU ARE DRINKING?: PATIENT DOES NOT DRINK

## 2024-10-11 SDOH — SOCIAL STABILITY: SOCIAL INSECURITY: DO YOU FEEL UNSAFE GOING BACK TO THE PLACE WHERE YOU ARE LIVING?: NO

## 2024-10-11 SDOH — SOCIAL STABILITY: SOCIAL NETWORK
DO YOU BELONG TO ANY CLUBS OR ORGANIZATIONS SUCH AS CHURCH GROUPS UNIONS, FRATERNAL OR ATHLETIC GROUPS, OR SCHOOL GROUPS?: YES

## 2024-10-11 SDOH — SOCIAL STABILITY: SOCIAL INSECURITY: DO YOU FEEL ANYONE HAS EXPLOITED OR TAKEN ADVANTAGE OF YOU FINANCIALLY OR OF YOUR PERSONAL PROPERTY?: NO

## 2024-10-11 SDOH — SOCIAL STABILITY: SOCIAL NETWORK: HOW OFTEN DO YOU ATTENT MEETINGS OF THE CLUB OR ORGANIZATION YOU BELONG TO?: MORE THAN 4 TIMES PER YEAR

## 2024-10-11 SDOH — SOCIAL STABILITY: SOCIAL INSECURITY: HAVE YOU HAD THOUGHTS OF HARMING ANYONE ELSE?: NO

## 2024-10-11 SDOH — SOCIAL STABILITY: SOCIAL INSECURITY: ABUSE: ADULT

## 2024-10-11 ASSESSMENT — COGNITIVE AND FUNCTIONAL STATUS - GENERAL
MOBILITY SCORE: 24
DAILY ACTIVITIY SCORE: 16
DRESSING REGULAR UPPER BODY CLOTHING: A LITTLE
MOBILITY SCORE: 24
MOVING TO AND FROM BED TO CHAIR: A LITTLE
HELP NEEDED FOR BATHING: A LOT
PATIENT BASELINE BEDBOUND: NO
MOBILITY SCORE: 14
DAILY ACTIVITIY SCORE: 23
PERSONAL GROOMING: A LITTLE
TURNING FROM BACK TO SIDE WHILE IN FLAT BAD: A LITTLE
DAILY ACTIVITIY SCORE: 23
TOILETING: A LITTLE
EATING MEALS: A LITTLE
WALKING IN HOSPITAL ROOM: TOTAL
CLIMB 3 TO 5 STEPS WITH RAILING: TOTAL
STANDING UP FROM CHAIR USING ARMS: A LITTLE
MOVING FROM LYING ON BACK TO SITTING ON SIDE OF FLAT BED WITH BEDRAILS: A LITTLE
DRESSING REGULAR LOWER BODY CLOTHING: A LITTLE
DRESSING REGULAR LOWER BODY CLOTHING: A LITTLE
DRESSING REGULAR LOWER BODY CLOTHING: A LOT

## 2024-10-11 ASSESSMENT — ACTIVITIES OF DAILY LIVING (ADL)
HEARING - RIGHT EAR: FUNCTIONAL
PATIENT'S MEMORY ADEQUATE TO SAFELY COMPLETE DAILY ACTIVITIES?: YES
JUDGMENT_ADEQUATE_SAFELY_COMPLETE_DAILY_ACTIVITIES: YES
WALKS IN HOME: INDEPENDENT
TOILETING: INDEPENDENT
LACK_OF_TRANSPORTATION: NO
ADL_ASSISTANCE: INDEPENDENT
BATHING: INDEPENDENT
ADEQUATE_TO_COMPLETE_ADL: YES
LACK_OF_TRANSPORTATION: NO
ADL_ASSISTANCE: INDEPENDENT
GROOMING: INDEPENDENT
HEARING - LEFT EAR: FUNCTIONAL
FEEDING YOURSELF: INDEPENDENT
BATHING_ASSISTANCE: MODERATE
DRESSING YOURSELF: NEEDS ASSISTANCE

## 2024-10-11 ASSESSMENT — LIFESTYLE VARIABLES
PRESCIPTION_ABUSE_PAST_12_MONTHS: NO
HOW MANY STANDARD DRINKS CONTAINING ALCOHOL DO YOU HAVE ON A TYPICAL DAY: PATIENT DOES NOT DRINK
SKIP TO QUESTIONS 9-10: 1
HOW OFTEN DO YOU HAVE A DRINK CONTAINING ALCOHOL: NEVER
SKIP TO QUESTIONS 9-10: 1
AUDIT-C TOTAL SCORE: 0
HOW OFTEN DO YOU HAVE 6 OR MORE DRINKS ON ONE OCCASION: NEVER
AUDIT-C TOTAL SCORE: 0
SUBSTANCE_ABUSE_PAST_12_MONTHS: NO
AUDIT-C TOTAL SCORE: 0

## 2024-10-11 ASSESSMENT — ENCOUNTER SYMPTOMS
SLEEP DISTURBANCE: 0
LIGHT-HEADEDNESS: 0
BRUISES/BLEEDS EASILY: 0
SEIZURES: 0
EYE REDNESS: 0
EYE DISCHARGE: 0
WHEEZING: 0
JOINT SWELLING: 0
NECK PAIN: 0
DIAPHORESIS: 0
HEADACHES: 0
FACIAL ASYMMETRY: 0
ANAL BLEEDING: 0
CHEST TIGHTNESS: 0
DIARRHEA: 0
PALPITATIONS: 1
COLOR CHANGE: 0
ABDOMINAL DISTENTION: 0
FEVER: 0
NAUSEA: 0
ARTHRALGIAS: 0
RHINORRHEA: 0
BLOOD IN STOOL: 0
SINUS PAIN: 0
RECTAL PAIN: 0
ADENOPATHY: 0
POLYDIPSIA: 0
SHORTNESS OF BREATH: 1
EYE ITCHING: 0
POLYPHAGIA: 0
AGITATION: 0
MYALGIAS: 0
CHOKING: 0
FLANK PAIN: 0
SINUS PRESSURE: 0
TROUBLE SWALLOWING: 0
CHILLS: 0
DIFFICULTY URINATING: 0
TREMORS: 0
NUMBNESS: 0
FATIGUE: 0
CONSTIPATION: 0
DYSPHORIC MOOD: 0
SPEECH DIFFICULTY: 0
PHOTOPHOBIA: 0
DIZZINESS: 0
SORE THROAT: 0
CONFUSION: 0
BACK PAIN: 0
APPETITE CHANGE: 0
HALLUCINATIONS: 0
FACIAL SWELLING: 0
ACTIVITY CHANGE: 0
EYE PAIN: 0
STRIDOR: 0
HYPERACTIVE: 0
APNEA: 0
DYSURIA: 0
NERVOUS/ANXIOUS: 0
COUGH: 0
WEAKNESS: 0
ABDOMINAL PAIN: 0
DECREASED CONCENTRATION: 0
UNEXPECTED WEIGHT CHANGE: 0
HEMATURIA: 0
NECK STIFFNESS: 0
VOMITING: 0
FREQUENCY: 0
WOUND: 0
VOICE CHANGE: 0

## 2024-10-11 ASSESSMENT — PAIN - FUNCTIONAL ASSESSMENT
PAIN_FUNCTIONAL_ASSESSMENT: 0-10

## 2024-10-11 ASSESSMENT — PATIENT HEALTH QUESTIONNAIRE - PHQ9
2. FEELING DOWN, DEPRESSED OR HOPELESS: NOT AT ALL
SUM OF ALL RESPONSES TO PHQ9 QUESTIONS 1 & 2: 0
1. LITTLE INTEREST OR PLEASURE IN DOING THINGS: NOT AT ALL

## 2024-10-11 ASSESSMENT — PAIN SCALES - GENERAL
PAINLEVEL_OUTOF10: 3
PAINLEVEL_OUTOF10: 3
PAINLEVEL_OUTOF10: 0 - NO PAIN

## 2024-10-11 ASSESSMENT — PAIN DESCRIPTION - LOCATION
LOCATION: CHEST
LOCATION: CHEST

## 2024-10-11 ASSESSMENT — PAIN SCALES - WONG BAKER
WONGBAKER_NUMERICALRESPONSE: HURTS LITTLE MORE
WONGBAKER_NUMERICALRESPONSE: HURTS LITTLE BIT

## 2024-10-11 NOTE — PROGRESS NOTES
10/11/24 1114   Discharge Planning   Living Arrangements Children   Support Systems Children;Parent;Family members   Assistance Needed Patient reports she is independent of all ADLs and IADLs.   Type of Residence Private residence   Number of Stairs to Enter Residence 0   Number of Stairs Within Residence 12   Do you have animals or pets at home? Yes   Type of Animals or Pets 2 cats   Who is requesting discharge planning? Provider   Home or Post Acute Services None   Expected Discharge Disposition Home   Does the patient need discharge transport arranged? No   Financial Resource Strain   How hard is it for you to pay for the very basics like food, housing, medical care, and heating? Not very   Housing Stability   In the last 12 months, was there a time when you were not able to pay the mortgage or rent on time? N   In the past 12 months, how many times have you moved where you were living? 0   At any time in the past 12 months, were you homeless or living in a shelter (including now)? N   Transportation Needs   In the past 12 months, has lack of transportation kept you from medical appointments or from getting medications? no   In the past 12 months, has lack of transportation kept you from meetings, work, or from getting things needed for daily living? No     MSW spoke with patient. Patient lives at home with her five children ages 25, 18, 16, 13, and 12. Patient reports her oldest and mother are looking after her younger children during this admission. Patient reports she is independent at home and denies any home going needs or concerns at this time. Patient plans to return home upon discharge with family to transport.

## 2024-10-11 NOTE — H&P
History Of Present Illness  Elli Mejia is a 44 y.o. female presenting with chest pain and palpitation.  Patient was doing fine yesterday when she suddenly developed retrosternal chest pain associated with palpitation.  She felt sharp pressure-like sensation.  In the emergency room EKG showed atrial flutter.  Patient was cardioverted.  Her heart rate dropped with she was in sinus tachycardia.  Started on diltiazem drip.  Patient is extremely anxious she has strong family history of heart disease in mother and sister.  She is on thyroid medication but very noncompliant and take it every other day.  She is also having very heavy periods for past 1 year.  She has not done follow-up with me for over a year has not done blood work.  She is also found to be  anemic     Past Medical History  No past medical history on file.  Hypothyroidism  Surgical History  No past surgical history on file.     Social History  She reports that she has never smoked. She has never used smokeless tobacco. She reports that she does not drink alcohol and does not use drugs.    Family History  No family history on file.  Mother and sister has coronary artery disease  Allergies  Patient has no known allergies.    Review of Systems   Constitutional:  Negative for activity change, appetite change, chills, diaphoresis, fatigue, fever and unexpected weight change.   HENT:  Negative for congestion, dental problem, drooling, ear discharge, ear pain, facial swelling, hearing loss, mouth sores, nosebleeds, postnasal drip, rhinorrhea, sinus pressure, sinus pain, sneezing, sore throat, tinnitus, trouble swallowing and voice change.    Eyes:  Negative for photophobia, pain, discharge, redness, itching and visual disturbance.   Respiratory:  Positive for shortness of breath. Negative for apnea, cough, choking, chest tightness, wheezing and stridor.    Cardiovascular:  Positive for chest pain and palpitations. Negative for leg swelling.    Gastrointestinal:  Negative for abdominal distention, abdominal pain, anal bleeding, blood in stool, constipation, diarrhea, nausea, rectal pain and vomiting.   Endocrine: Negative for cold intolerance, heat intolerance, polydipsia, polyphagia and polyuria.   Genitourinary:  Negative for decreased urine volume, difficulty urinating, dysuria, enuresis, flank pain, frequency, genital sores, hematuria and urgency.   Musculoskeletal:  Negative for arthralgias, back pain, gait problem, joint swelling, myalgias, neck pain and neck stiffness.   Skin:  Negative for color change, pallor, rash and wound.   Allergic/Immunologic: Negative for environmental allergies, food allergies and immunocompromised state.   Neurological:  Negative for dizziness, tremors, seizures, syncope, facial asymmetry, speech difficulty, weakness, light-headedness, numbness and headaches.   Hematological:  Negative for adenopathy. Does not bruise/bleed easily.   Psychiatric/Behavioral:  Negative for agitation, behavioral problems, confusion, decreased concentration, dysphoric mood, hallucinations, self-injury, sleep disturbance and suicidal ideas. The patient is not nervous/anxious and is not hyperactive.         Physical Exam  Vitals reviewed.   Constitutional:       Appearance: Normal appearance.   HENT:      Head: Normocephalic and atraumatic.      Right Ear: Tympanic membrane, ear canal and external ear normal.      Left Ear: Tympanic membrane, ear canal and external ear normal.      Nose: Nose normal.      Mouth/Throat:      Pharynx: Oropharynx is clear.   Eyes:      Extraocular Movements: Extraocular movements intact.      Conjunctiva/sclera: Conjunctivae normal.      Pupils: Pupils are equal, round, and reactive to light.   Cardiovascular:      Rate and Rhythm: Normal rate. Rhythm irregular.      Pulses: Normal pulses.      Heart sounds: Normal heart sounds.   Pulmonary:      Effort: Pulmonary effort is normal.      Breath sounds: Normal  "breath sounds.   Abdominal:      General: Abdomen is flat. Bowel sounds are normal.      Palpations: Abdomen is soft.   Musculoskeletal:      Cervical back: Normal range of motion and neck supple.   Skin:     General: Skin is warm and dry.   Neurological:      General: No focal deficit present.      Mental Status: She is alert and oriented to person, place, and time.   Psychiatric:         Mood and Affect: Mood normal.          Last Recorded Vitals  Blood pressure 121/78, pulse 80, temperature 36 °C (96.8 °F), temperature source Temporal, resp. rate 18, height 1.676 m (5' 6\"), weight 137 kg (303 lb 1.6 oz), last menstrual period 09/29/2024, SpO2 96%.    Relevant Results        Scheduled medications  atorvastatin, 40 mg, oral, Nightly  chlorhexidine, 15 mL, Mouth/Throat, BID  docusate sodium, 100 mg, oral, BID  enoxaparin, 40 mg, subcutaneous, q12h TUNDE  levothyroxine, 200 mcg, oral, Daily  melatonin, 3 mg, oral, Nightly  methocarbamol, 750 mg, oral, BID  metoprolol succinate XL, 100 mg, oral, Daily  naproxen, 500 mg, oral, BID  pantoprazole, 40 mg, oral, Daily before breakfast   Or  pantoprazole, 40 mg, intravenous, Daily before breakfast  piperacillin-tazobactam, 4.5 g, intravenous, q8h  polyethylene glycol, 17 g, oral, Daily  valACYclovir, 1,000 mg, oral, TID      Continuous medications     PRN medications  PRN medications: acetaminophen **OR** acetaminophen **OR** acetaminophen, benzocaine-menthol, cyclobenzaprine, dextromethorphan-guaifenesin, guaiFENesin, hydrocortisone, ondansetron ODT **OR** ondansetron  Results for orders placed or performed during the hospital encounter of 10/10/24 (from the past 24 hour(s))   Lactate   Result Value Ref Range    Lactate 1.7 0.4 - 2.0 mmol/L   CBC   Result Value Ref Range    WBC 17.1 (H) 4.4 - 11.3 x10*3/uL    nRBC 0.0 0.0 - 0.0 /100 WBCs    RBC 2.78 (L) 4.00 - 5.20 x10*6/uL    Hemoglobin 8.1 (L) 12.0 - 16.0 g/dL    Hematocrit 25.9 (L) 36.0 - 46.0 %    MCV 93 80 - 100 fL    " MCH 29.1 26.0 - 34.0 pg    MCHC 31.3 (L) 32.0 - 36.0 g/dL    RDW 15.6 (H) 11.5 - 14.5 %    Platelets 391 150 - 450 x10*3/uL   Comprehensive metabolic panel   Result Value Ref Range    Glucose 129 (H) 74 - 99 mg/dL    Sodium 138 136 - 145 mmol/L    Potassium 3.7 3.5 - 5.3 mmol/L    Chloride 105 98 - 107 mmol/L    Bicarbonate 23 21 - 32 mmol/L    Anion Gap 14 10 - 20 mmol/L    Urea Nitrogen 37 (H) 6 - 23 mg/dL    Creatinine 0.93 0.50 - 1.05 mg/dL    eGFR 79 >60 mL/min/1.73m*2    Calcium 8.2 (L) 8.6 - 10.3 mg/dL    Albumin 3.7 3.4 - 5.0 g/dL    Alkaline Phosphatase 48 33 - 110 U/L    Total Protein 6.4 6.4 - 8.2 g/dL    AST 20 9 - 39 U/L    Bilirubin, Total 0.2 0.0 - 1.2 mg/dL    ALT 17 7 - 45 U/L   Troponin I, High Sensitivity   Result Value Ref Range    Troponin I, High Sensitivity 30 (H) 0 - 13 ng/L   Transthoracic Echo (TTE) Complete   Result Value Ref Range    BSA 2.53 m2     X-rays reviewed       Assessment/Plan   Assessment & Plan  Atrial flutter with rapid ventricular response (Multi)    Anemia    Hyperlipidemia    Hypothyroidism    Menorrhagia with regular cycle    Depression with anxiety    Obesity, Class III, BMI 40-49.9 (morbid obesity) (Multi)      Chest pain protocol  Cardiology consult  Lovenox  Cardizem drip  Status post cardioversion  DC NSAID  Ultrasound pelvis  Monitor H&H  Patient was taking a lot of anti-inflammatories recently monitor for GI bleeding  TSH elevated  Patient is noncompliant with medication  Resume Synthroid every day       I spent  minutes in the professional and overall care of this patient.      Nilam Sams MD

## 2024-10-11 NOTE — PROGRESS NOTES
Occupational Therapy    Evaluation    Patient Name: Elli Mejia  MRN: [id number]  Department: [facility] NON  Room: [address] [place], [place] [address]  Today's Date: 10/11/2024  Time Calculation  Start Time: 0842  Stop Time: 0915  Time Calculation (min): 33 min    Assessment  IP OT Assessment  OT Assessment: Pt is 43 y/o female admitted for CP, aflutter w/ RCE and injured R rotator cuff . Pt presents w/ decreased ADL skills/ functional mobility, decreased activity tolerance, impaired RUE. REcommend OT services to address above deficits.  Prognosis: Good  Barriers to Discharge: Other (Comment) (injured rotator cuff RUE)  Evaluation/Treatment Tolerance: Other (Comment) (Pt very anxious about medical status/procedures)  Medical Staff Made Aware: Yes  End of Session Communication: Bedside nurse  End of Session Patient Position: Bed, 3 rail up, Alarm off, not on at start of session  Plan:  Treatment Interventions: ADL retraining, Functional transfer training, Endurance training, Patient/family training, Compensatory technique education, Equipment evaluation/education  OT Frequency: 3 times per week  OT Discharge Recommendations: Low intensity level of continued care  OT - OK to Discharge: Yes    Subjective   Current Problem:  1. Atrial flutter with rapid ventricular response (Multi)        2. Acute chest pain        3. Postoperative hypothyroidism        4. Noncompliance with medication regimen        5. Leukocytosis, unspecified type        6. Traumatic tear of right rotator cuff, unspecified tear extent, subsequent encounter        7. Acute electrocardiogram changes        8. Elevated troponin        9. Precordial pain  Nuclear Stress Test    Nuclear Stress Test    Cardiology Interpretation Of Nuclear Stress - See Other Report For Nuclear Portion    Cardiology Interpretation Of Nuclear Stress - See Other Report For Nuclear Portion      10. SOB (shortness of breath)  Transthoracic Echo (TTE) Complete    Transthoracic Echo (TTE) Complete         General:  General  Reason for Referral: decreased ADL's  Referred By: Dr. Sams  Past Medical History Relevant to Rehab: Thyroid sx  Family/Caregiver Present: No  Prior to Session Communication: Bedside nurse  Patient Position Received: Bed, 3 rail up, Alarm off, not on at start of session  Preferred Learning Style: verbal, visual  General Comment: RVR, and rotator cuff injury.  Precautions:  UE Weight Bearing Status: Right Non-Weight Bearing  Medical Precautions: Fall precautions  Precautions Comment: Sling appllied suring session to RUE.    Vital Sign (Past 2hrs)        Date/Time Vitals Session Patient Position Pulse Resp SpO2 BP MAP (mmHg)    10/11/24 1128 --  --  80  23  96 %  101/58  71                        Pain:  Pain Assessment  Pain Assessment: 0-10  0-10 (Numeric) Pain Score:  (10/10 Rt shld pain, 3/10 CP at start of session.)    Objective   Cognition:  Overall Cognitive Status: Within Functional Limits  Cognition Comments: Pt very anxious about testing and current medical situation           Home Living:  Type of Home: House  Lives With: Adult children, Dependent children  Home Adaptive Equipment: None  Home Layout: Multi-level, Able to live on main level with bedroom/bathroom  Home Access: Level entry  Bathroom Shower/Tub: Tub/shower unit  Bathroom Equipment: None   Prior Function:  Level of [place]: Independent with ADLs and functional transfers, Independent with homemaking with ambulation  Receives Help From: Family  ADL Assistance: Independent  Homemaking Assistance: Independent  Ambulatory Assistance: Independent  Vocational: Full time employment (Works from home)  Hand Dominance: Right  IADL History:  Homemaking Responsibilities: Yes  Meal Prep Responsibility: Primary  Laundry Responsibility: Primary  Cleaning Responsibility: Primary  Bill Paying/Finance Responsibility: Primary  Shopping Responsibility: Primary   Responsibility: Primary  Current License: Yes  Mode of  Transportation: Car  ADL:  Eating Assistance: Stand by  Eating Deficit: Setup, Increased time to complete  Grooming Assistance: Stand by  Grooming Deficit: Setup, Supervision/safety, Increased time to complete  Bathing Assistance: Moderate  Bathing Deficit: Supervision/safety, Perineal area, Buttocks, Right lower leg including foot, Left lower leg including foot  UE Dressing Assistance: Minimal  UE Dressing Deficit: Supervision/safety, Increased time to complete, Thread RUE, Pull over head, Pull around back, Pull down in back, Fasteners  LE Dressing Assistance: Moderate  LE Dressing Deficit: Steadying, Supervision/safety, Increased time to complete, Don/doff R sock, Don/doff L sock, Thread RLE into pants, Thread LLE into pants, Thread RLE into underwear, Thread LLE into underwear, Pull up over hips, Fasteners  Toileting Assistance with Device: Not performed  Functional Assistance: Not performed  Activity Tolerance:  Endurance: Decreased tolerance for upright activites  Activity Tolerance Comments: increased CP w/ mobility  Bed Mobility/Transfers: Bed Mobility  Bed Mobility: Yes  Bed Mobility 1  Bed Mobility 1: Supine to sitting  Level of Assistance 1: Close supervision  Bed Mobility Comments 1: HOB elev, use of bedrail  Bed Mobility 2  Bed Mobility  2: Sitting to supine  Level of Assistance 2: Close supervision  Bed Mobility Comments 2: HOB elev, use of bedrail, very effortful, getting BLE's into bed    Transfers  Transfer: Yes  Transfer 1  Transfer From 1: Bed to  Transfer to 1: Stand  Technique 1: Sit to stand, Stand to sit  Transfer Device 1:  (no AD)  Transfer Level of Assistance 1: Close supervision      Functional Mobility:  Functional Mobility  Functional Mobility Performed: Yes  Functional Mobility 1  Surface 1: Level tile  Device 1:  (no device)  Assistance 1: Minimum assistance  Comments 1: Pt able to take 2-3 steps forwardand back around to EOB, increased CP, pt very anxious, returned to  EOB.  Modalities:     IADL's:   Homemaking Responsibilities: Yes  Meal Prep Responsibility: Primary  Laundry Responsibility: Primary  Cleaning Responsibility: Primary  Bill Paying/Finance Responsibility: Primary  Shopping Responsibility: Primary   Responsibility: Primary  Current License: Yes  Mode of Transportation: Car  Vision: Vision - Basic Assessment  Current Vision: No visual deficits  Sensation:  Light Touch:  (Pt c/o of numbness in Rt hand that has been there since shld injury)  Strength:  Strength Comments: grossly =/> 3/5, not formal MMT RUE d/t shld injury, functional observ. LUE  Perception:     Coordination:  Movements are Fluid and Coordinated: Yes   Hand Function:  Hand Function  Gross Grasp: Functional  Coordination: Functional  Extremities: RUE   RUE :  (impaired shld, WFL rest) and LUE   LUE: Within Functional Limits      Outcome Measures: [facility] Daily Activity  Putting on and taking off regular lower body clothing: A lot  Bathing (including washing, rinsing, drying): A lot  Putting on and taking off regular upper body clothing: A little  Toileting, which includes using toilet, bedpan or urinal: A little  Taking care of personal grooming such as brushing teeth: A little  Eating Meals: A little  Daily Activity - Total Score: 16      Education Documentation  ADL Training, taught by Marlena Neumann, OT at 10/11/2024  1:23 PM.  Learner: Patient  Readiness: Acceptance  Method: Explanation  Response: Needs Reinforcement    Education Comments  No comments found.      Goals:   Encounter Problems       Encounter Problems (Active)       OT Goals       ADL's (Progressing)       Start:  10/11/24    Expected End:  10/25/24       Pt will complete ADL tasks w/ Mod I w/ AE/ compensatory tech for safety and increased independence in self care tasks.         Functional transfers (Progressing)       Start:  10/11/24    Expected End:  10/25/24       Pt will demon functional tranfers including but not limited  to bed, chair and toilet transfers w/ Mod I w/ LRD as needed for safety and increased independence in daily tasks .         Functional endurance (Progressing)       Start:  10/11/24    Expected End:  10/25/24       Pt will tolerate 20 minutes of functional activity to improve functional endurance for safety and increased independence in daily tasks and functional mobility.

## 2024-10-11 NOTE — PROGRESS NOTES
Physical Therapy Evaluation & Treatment    Patient Name: Elli Mejia  MRN: [id number]  Department: [facility]  Room: 424424-A  Today's Date: 10/11/2024   Time Calculation  Start Time: 0959  Stop Time: 1024  Time Calculation (min): 25 min    Assessment/Plan   PT Assessment  PT Assessment Results: Decreased strength, Decreased endurance, Impaired balance, Decreased mobility, Impaired judgement, Decreased cognition, Decreased safety awareness, Impaired sensation, Decreased skin integrity, Pain, Obesity  Rehab Prognosis: Good  Barriers to Discharge: Inability to ambulate away from EOB due to CP  Evaluation/Treatment Tolerance: Patient limited by pain  Medical Staff Made Aware: Yes  End of Session Communication: Bedside nurse  Assessment Comment: 44 year old female admits with Acute CP, Afluter, and traumatic R rotator cuff injury. On eval, she demonstrates impaired safe mobility warranting skilled PT care. At DC, low intensity rehab is recommended in anticipation that functional abilities will improve as medical status stabilizes.  End of Session Patient Position: Bed, 3 rail up, Alarm on   IP OR SWING BED PT PLAN  Inpatient or Swing Bed: Inpatient  PT Plan  Treatment/Interventions: Bed mobility, Transfer training, Gait training, Balance training, Strengthening, Endurance training, Therapeutic exercise, Therapeutic activity, Home exercise program  PT Plan: Ongoing PT  PT Frequency: 5 times per week  PT Discharge Recommendations: Low intensity level of continued care  PT Recommended Transfer Status: Assist x1  PT - OK to Discharge: Yes      Subjective     General Visit Information:  General  Reason for Referral: Impaired Mobility  Referred By: Dr. Sams  Past Medical History Relevant to Rehab: Thyroid Surg  Family/Caregiver Present: No  Prior to Session Communication: Bedside nurse  Patient Position Received: Bed, 3 rail up, Alarm off, not on at start of session  Preferred Learning Style: verbal, visual  General  Comment: 44 year old female admits with Acute CP, Afluter, and traumatic R rotator cuff injury.  Home Living:  Home Living  Type of Home: House  Lives With: Adult children, Dependent children (26 y/o, 17 y/o, 17y/o, 12y/o, and 13y/o)  Home Adaptive Equipment: None  Home Layout: Multi-level, Able to live on main level with bedroom/bathroom  Home Access: Level entry  Bathroom Shower/Tub: Tub/shower unit  Bathroom Equipment: None  Prior Level of Function:  Prior Function Per Pt/Caregiver Report  Level of [place]: Independent with ADLs and functional transfers, Independent with homemaking with ambulation  Receives Help From: Family  ADL Assistance: Independent  Homemaking Assistance: Independent  Ambulatory Assistance: Independent  Prior Function Comments: Denies fall hx. No AD use at home  Precautions:  Precautions  UE Weight Bearing Status: Right Non-Weight Bearing (sling on at start of session)  Medical Precautions: Fall precautions    Vital Signs (Past 2hrs)        Date/Time Vitals Session Patient Position Pulse Resp SpO2 BP MAP (mmHg)    10/11/24 1128 --  --  80  23  96 %  101/58  71                   Vital Signs Comment: Supine at start of session: 87HR 127/72 BP, Sitting EOB: 89hr 128/94, Standing/Walking HR up to 112 unable to obtain BP. Complaints of increased CP with activity. Activity terminated    Objective   Pain:  Pain Assessment  Pain Assessment: 0-10  0-10 (Numeric) Pain Score:  (10/10 R shoulder pain in sling unchanged throughout session. 3/10 CP at rest and sitting at EOB but increases to 5/10 with attempt at gait)  Pain Interventions: Ambulation/increased activity  Cognition:  Cognition  Overall Cognitive Status: Within Functional Limits  Cognition Comments: Emotional and anxious towards current medical situation    General Assessments:  Activity Tolerance  Endurance: Decreased tolerance for upright activites  Activity Tolerance Comments: Increaed CP with attempt at gait today    Sensation  Light  Touch:  (Does complain of RUE+R hand numbness post R shoulder injury. Pending ortho consult)    Strength  Strength Comments: Grossly at least 4/5 BLEs on MMT  Functional Assessments:  Bed Mobility  Bed Mobility: Yes  Bed Mobility 1  Bed Mobility 1: Supine to sitting  Level of Assistance 1: Close supervision  Bed Mobility Comments 1: Safety near EOB. Very laborous for Pt. HOB highly elevated. L rail used  Bed Mobility 2  Bed Mobility  2: Sitting to supine  Level of Assistance 2: Close supervision  Bed Mobility Comments 2: HOB very elevated. L rail used. Still highly laborous. Really struggles to get BLEs back onto bed surface. Cues on technique    Transfers  Transfer: Yes  Transfer 1  Transfer From 1: Bed to  Transfer to 1: Stand  Technique 1: Sit to stand, Stand to sit  Transfer Device 1:  (no AD)  Transfer Level of Assistance 1: Close supervision  Trials/Comments 1: CS for safety. x2 trials    Ambulation/Gait Training  Ambulation/Gait Training Performed: Yes  Ambulation/Gait Training 1  Surface 1: Level tile  Device 1: No device  Assistance 1: Minimum assistance  Quality of Gait 1:  (to steady)  Comments/Distance (ft) 1: 2' away from EOB before activity terminated due to 5/10 CP complaints    Treatments:  Bed Mobility  Bed Mobility: Yes  Bed Mobility 1  Bed Mobility 1: Supine to sitting  Level of Assistance 1: Close supervision  Bed Mobility Comments 1: Safety near EOB. Very laborous for Pt. HOB highly elevated. L rail used  Bed Mobility 2  Bed Mobility  2: Sitting to supine  Level of Assistance 2: Close supervision  Bed Mobility Comments 2: HOB very elevated. L rail used. Still highly laborous. Really struggles to get BLEs back onto bed surface. Cues on technique    Transfers  Transfer: Yes  Transfer 1  Transfer From 1: Bed to  Transfer to 1: Stand  Technique 1: Sit to stand, Stand to sit  Transfer Device 1:  (no AD)  Transfer Level of Assistance 1: Close supervision  Trials/Comments 1: CS for safety. x2  trials    Education as below including importance of OOB activity and the risks of prolonged bedrest during acute care stay.  Outcome Measures:  [facility] Basic Mobility  Turning from your back to your side while in a flat bed without using bedrails: A little  Moving from lying on your back to sitting on the side of a flat bed without using bedrails: A little  Moving to and from bed to chair (including a wheelchair): A little  Standing up from a chair using your arms (e.g. wheelchair or bedside chair): A little  To walk in hospital room: Total  Climbing 3-5 steps with railing: Total  Basic Mobility - Total Score: 14    Encounter Problems       Encounter Problems (Active)       Balance       Standing Balance       Start:  10/11/24    Expected End:  10/25/24       Pt will demonstrate good static standing balance to promote safe participation with out of bed activity, transfers, and mobility              Mobility       Ambulation STG       Start:  10/11/24    Expected End:  10/25/24       Pt will ambulate 50' modified independent assist with LRD to promote safe home mobility           Ambulation LTG       Start:  10/11/24    Expected End:  10/25/24       Pt will ambulate 300' independent assist with no device to promote safe home mobility              PT Transfers       Supine to sit       Start:  10/11/24    Expected End:  10/25/24       Pt will transfer supine to sitting at edge of bed with modified independent assist to promote acute care out of bed activity           Sit to stand       Start:  10/11/24    Expected End:  10/25/24       Pt will transfer sit to standing position with modified independent assist and LRD to promote safe out of bed activity              Safety       Safe Mobility Techniques       Start:  10/11/24    Expected End:  10/25/24       Pt will correctly identify and demonstrate safe mobility techniques to reduce their risks for falls during their acute care stay                   Education  Documentation  Mobility Training, taught by Keyur Lewis, PT at 10/11/2024 11:58 AM.  Learner: Patient  Readiness: Acceptance  Method: Explanation, Demonstration  Response: Needs Reinforcement  Comment: safe mobility techniques    Education Comments  No comments found.

## 2024-10-11 NOTE — CONSULTS
Inpatient consult to Cardiology  Consult performed by: Yovani Pandey MD  Consult ordered by: Nilam Sams MD  Reason for consult: Atrial flutter, elevated troponin no shortness of breath, chest pain        History Of Present Illness:    Elli Mejia is a 44 y.o. female presenting with chest pain and palpitations.  Patient reports being at usual state of health until yesterday when she developed retrosternal chest pain associated with palpitations.  It is sharp and pressure-like sensation.  Presented to the emergency room.  Her initial EKG was showing questionable atrial flutter.  Patient received cardioversion.  Her heart rate dropped but subsequently increased again in sinus tachycardia rhythm.  She was started on diltiazem drip.  Given Lovenox overnight.  This morning patient still complaining of chest pain.  Her troponin increased from 14-44.  EKG does not suggest any ST-T wave abnormalities.  Currently laying comfortable in bed.  Has elevated white count.  Has also severe anemia.    Review of systems.  10 point review of systems otherwise negative.     Last Recorded Vitals:  Vitals:    10/11/24 0028 10/11/24 0232 10/11/24 0505 10/11/24 0740   BP: 132/84 126/70 (!) 95/41 (!) 133/96   BP Location: Left arm Left arm Left arm Left arm   Patient Position: Lying Lying Lying Lying   Pulse: 83 80 77 92   Resp:  19 18 15   Temp: 36.2 °C (97.2 °F)  35.9 °C (96.6 °F) 36 °C (96.8 °F)   TempSrc: Temporal  Temporal Temporal   SpO2: 100%  99% 99%   Weight:   137 kg (303 lb 1.6 oz)    Height:           Last Labs:  CBC - 10/11/2024:  4:28 AM  17.1 8.1 391    25.9      CMP - 10/11/2024:  4:28 AM  8.2 6.4 20 --- 0.2   _ 3.7 17 48      PTT - No results in last year.  _   _ _     Troponin I, High Sensitivity   Date/Time Value Ref Range Status   10/11/2024 04:28 AM 30 (H) 0 - 13 ng/L Final   10/10/2024 05:18 PM 44 (H) 0 - 13 ng/L Final   10/10/2024 04:19 PM 13 0 - 13 ng/L Final     Hemoglobin A1C   Date/Time Value Ref Range  "Status   09/13/2022 04:00 AM 5.8 4.0 - 6.0 % Final     Comment:     Hemoglobin A1C levels are related to mean blood glucose during the   preceding 2-3 months. The relationship table below may be used as a   general guide. Each 1% increase in HGB A1C is a reflection of an   increase in mean glucose of approximately 30 mg/dl.   Reference: Diabetes Care, volume 29, supplement 1 Jan. 2006                        HGB A1C ................. Approx. Mean Glucose   _______________________________________________   6%   ...............................  120 mg/dl   7%   ...............................  150 mg/dl   8%   ...............................  180 mg/dl   9%   ...............................  210 mg/dl   10%  ...............................  240 mg/dl  Performed at [address] [address]       [facility]   Date/Time Value Ref Range Status   05/05/2023 02:30 PM 62 (H) 0 - 40 mg/dL Final      Last I/O:  I/O last 3 completed shifts:  In: 2150 (15.6 mL/kg) [IV Piggyback:2150]  Out: - (0 mL/kg)   Weight: 137.5 kg     Past Cardiology Tests (Last 3 Years):  EKG:  ECG 12 lead 10/10/2024 (Preliminary)      ECG 12 lead 10/10/2024 (Preliminary)    Echo:  No results found for this or any previous visit from the past 1095 days.    Ejection Fractions:  No results found for: \"EF\"  Cath:  No results found for this or any previous visit from the past 1095 days.    Stress Test:  No results found for this or any previous visit from the past 1095 days.    Cardiac Imaging:  No results found for this or any previous visit from the past 1095 days.      Past Medical History:  She has no past medical history on file.    Past Surgical History:  She has no past surgical history on file.      Social History:  She reports that she has never smoked. She has never used smokeless tobacco. She reports that she does not drink alcohol and does not use drugs.    Family History:  No family history on file.     Allergies:  Patient has no " known allergies.    Inpatient Medications:  Scheduled medications   Medication Dose Route Frequency    atorvastatin  40 mg oral Nightly    chlorhexidine  15 mL Mouth/Throat BID    docusate sodium  100 mg oral BID    enoxaparin  40 mg subcutaneous q12h TUNDE    levothyroxine  200 mcg oral Daily    melatonin  3 mg oral Nightly    methocarbamol  750 mg oral BID    metoprolol succinate XL  100 mg oral Daily    naproxen  500 mg oral BID    pantoprazole  40 mg oral Daily before breakfast    Or    pantoprazole  40 mg intravenous Daily before breakfast    piperacillin-tazobactam  4.5 g intravenous q8h    polyethylene glycol  17 g oral Daily    valACYclovir  1,000 mg oral TID     PRN medications   Medication    acetaminophen    Or    acetaminophen    Or    acetaminophen    benzocaine-menthol    cyclobenzaprine    dextromethorphan-guaifenesin    guaiFENesin    hydrocortisone    ondansetron ODT    Or    ondansetron     Continuous Medications   Medication Dose Last Rate     Outpatient Medications:  Current Outpatient Medications   Medication Instructions    atorvastatin (LIPITOR) 40 mg, oral, Daily    chlorhexidine (Peridex) 0.12 % solution RINSE AND SPIT OUT WITH 1/2 OZ TWICE A DAY    cyclobenzaprine (FLEXERIL) 10 mg, oral, 3 times daily PRN    hydrocortisone 2.5 % cream Topical, 2 times daily PRN    ketoconazole (NIZOral) 2 % shampoo Topical, 2 times weekly, Shampoo daily, leave on for 5-10 minutes, then rinse.    levothyroxine (SYNTHROID, LEVOXYL) 200 mcg, oral, Daily    methocarbamol (ROBAXIN) 750 mg, oral, 2 times daily    naproxen (NAPROSYN) 500 mg, oral, 2 times daily (morning and late afternoon)    valACYclovir (Valtrex) 1 gram tablet 1 tablet, oral, 3 times daily    Wegovy 0.25 mg, subcutaneous, Once Weekly       Physical Exam:  General: Patient is in no acute distress.  HEENT: atraumatic normocephalic.  Neck: is supple jugular venous pressure within normal limits no thyromegaly.  Cardiovascular regular rate and rhythm  normal heart sounds no murmurs rubs or gallops.  Lungs: clear to auscultation bilaterally.  Abdomen: is soft nontender.  Extremities warm to touch no edema.  Neurologic examination: patient is awake alert oriented to person, place, date/time.  Psychiatric examination: patient has good insight denies feeling suicidal and depressed.  Pulses 2+ intact bilaterally     Assessment/Plan   #1 tachycardia.  All EKGs that I reviewed showing sinus tachycardia.  There is no clear evidence of atrial flutter.  Patient received cardioversion yesterday for flutter according to the ER physician.  Was given Lovenox one-time.  Her [id number] score is 1.  Patient currently on diltiazem drip.  Will stop diltiazem drip and start her on metoprolol 100 mg oral daily.  CT angio of the chest showed no pulmonary embolism.  Reviewed her CT angio no major calcifications in the coronaries.    2.  Chest pain.  Atypical chest pain associated with elevated white count.  No cough fever or chills.  Troponin slightly elevated after the cardioversion 43.  Will repeat another troponin this morning.  Will arrange for Lexiscan nuclear stress test as a workup for ischemia.  EKG does not suggest ischemia.  Currently still having mild chest pain.  Will obtain echocardiogram as workup for shortness of breath and chest pain.    3.  Hypertension.  Will switch patient to metoprolol 100 mg oral daily and reassess.    4.  Anemia.  Patient has severe anemia.  Reports heavy menses.  Workup per primary team.  Recommend check iron studies.    5.  Elevated white count.  Patient denies having fever chills no dysuria.  Workup by primary team.    6.  Hypothyroidism.  Patient on levothyroxine.  Likely noncompliance with medications.  Management by primary team    Thank you for the consult  Peripheral IV 10/10/24 20 G Distal;Right Forearm (Active)   Site Assessment Clean;Dry;Intact 10/11/24 0730   Dressing Status Clean;Dry 10/11/24 0730   Number of days: 1        Peripheral IV 10/10/24 20 G Left Antecubital (Active)   Site Assessment Clean;Dry;Intact 10/11/24 0730   Dressing Status Clean;Dry 10/11/24 0730   Number of days: 1       Code Status:  Full Code    Yovani Pandey MD

## 2024-10-12 ENCOUNTER — APPOINTMENT (OUTPATIENT)
Dept: RADIOLOGY | Facility: HOSPITAL | Age: 44
End: 2024-10-12
Payer: COMMERCIAL

## 2024-10-12 LAB
ALBUMIN SERPL BCP-MCNC: 3.8 G/DL (ref 3.4–5)
ALP SERPL-CCNC: 50 U/L (ref 33–110)
ALT SERPL W P-5'-P-CCNC: 18 U/L (ref 7–45)
ANION GAP SERPL CALCULATED.3IONS-SCNC: 12 MMOL/L (ref 10–20)
AORTIC VALVE PEAK VELOCITY: 1.09 M/S
AST SERPL W P-5'-P-CCNC: 22 U/L (ref 9–39)
AV PEAK GRADIENT: 4.8 MMHG
AVA (PEAK VEL): 2.33 CM2
BILIRUB SERPL-MCNC: 0.3 MG/DL (ref 0–1.2)
BUN SERPL-MCNC: 22 MG/DL (ref 6–23)
CALCIUM SERPL-MCNC: 8.7 MG/DL (ref 8.6–10.3)
CHLORIDE SERPL-SCNC: 105 MMOL/L (ref 98–107)
CO2 SERPL-SCNC: 24 MMOL/L (ref 21–32)
CREAT SERPL-MCNC: 0.92 MG/DL (ref 0.5–1.05)
EGFRCR SERPLBLD CKD-EPI 2021: 80 ML/MIN/1.73M*2
EJECTION FRACTION APICAL 4 CHAMBER: 67.1
EJECTION FRACTION: 63 %
ERYTHROCYTE [DISTWIDTH] IN BLOOD BY AUTOMATED COUNT: 15.8 % (ref 11.5–14.5)
FERRITIN SERPL-MCNC: 22 NG/ML (ref 8–150)
GLUCOSE SERPL-MCNC: 115 MG/DL (ref 74–99)
HCT VFR BLD AUTO: 23.7 % (ref 36–46)
HGB BLD-MCNC: 7.4 G/DL (ref 12–16)
LEFT VENTRICULAR OUTFLOW TRACT DIAMETER: 2.1 CM
LV EJECTION FRACTION BIPLANE: 69 %
MCH RBC QN AUTO: 29.2 PG (ref 26–34)
MCHC RBC AUTO-ENTMCNC: 31.2 G/DL (ref 32–36)
MCV RBC AUTO: 94 FL (ref 80–100)
MITRAL VALVE E/A RATIO: 1.06
NRBC BLD-RTO: 0.1 /100 WBCS (ref 0–0)
PLATELET # BLD AUTO: 332 X10*3/UL (ref 150–450)
POTASSIUM SERPL-SCNC: 4.3 MMOL/L (ref 3.5–5.3)
PROT SERPL-MCNC: 6.4 G/DL (ref 6.4–8.2)
RBC # BLD AUTO: 2.53 X10*6/UL (ref 4–5.2)
RIGHT VENTRICLE FREE WALL PEAK S': 11.3 CM/S
RIGHT VENTRICLE PEAK SYSTOLIC PRESSURE: 13.9 MMHG
SODIUM SERPL-SCNC: 137 MMOL/L (ref 136–145)
WBC # BLD AUTO: 13.7 X10*3/UL (ref 4.4–11.3)

## 2024-10-12 PROCEDURE — 2500000001 HC RX 250 WO HCPCS SELF ADMINISTERED DRUGS (ALT 637 FOR MEDICARE OP): Performed by: INTERNAL MEDICINE

## 2024-10-12 PROCEDURE — 36415 COLL VENOUS BLD VENIPUNCTURE: CPT | Performed by: INTERNAL MEDICINE

## 2024-10-12 PROCEDURE — 85027 COMPLETE CBC AUTOMATED: CPT | Performed by: INTERNAL MEDICINE

## 2024-10-12 PROCEDURE — 99233 SBSQ HOSP IP/OBS HIGH 50: CPT | Performed by: INTERNAL MEDICINE

## 2024-10-12 PROCEDURE — 2500000004 HC RX 250 GENERAL PHARMACY W/ HCPCS (ALT 636 FOR OP/ED): Performed by: INTERNAL MEDICINE

## 2024-10-12 PROCEDURE — 76856 US EXAM PELVIC COMPLETE: CPT | Performed by: RADIOLOGY

## 2024-10-12 PROCEDURE — 76856 US EXAM PELVIC COMPLETE: CPT

## 2024-10-12 PROCEDURE — 2060000001 HC INTERMEDIATE ICU ROOM DAILY

## 2024-10-12 PROCEDURE — 84075 ASSAY ALKALINE PHOSPHATASE: CPT | Performed by: INTERNAL MEDICINE

## 2024-10-12 PROCEDURE — 82728 ASSAY OF FERRITIN: CPT | Performed by: NURSE PRACTITIONER

## 2024-10-12 PROCEDURE — 99232 SBSQ HOSP IP/OBS MODERATE 35: CPT | Performed by: INTERNAL MEDICINE

## 2024-10-12 ASSESSMENT — PAIN DESCRIPTION - LOCATION
LOCATION: SHOULDER

## 2024-10-12 ASSESSMENT — PAIN SCALES - GENERAL
PAINLEVEL_OUTOF10: 0 - NO PAIN
PAINLEVEL_OUTOF10: 8
PAINLEVEL_OUTOF10: 0 - NO PAIN
PAINLEVEL_OUTOF10: 3
PAINLEVEL_OUTOF10: 0 - NO PAIN
PAINLEVEL_OUTOF10: 6
PAINLEVEL_OUTOF10: 6
PAINLEVEL_OUTOF10: 7
PAINLEVEL_OUTOF10: 6
PAINLEVEL_OUTOF10: 3
PAINLEVEL_OUTOF10: 10 - WORST POSSIBLE PAIN

## 2024-10-12 ASSESSMENT — PAIN - FUNCTIONAL ASSESSMENT
PAIN_FUNCTIONAL_ASSESSMENT: 0-10

## 2024-10-12 ASSESSMENT — PAIN SCALES - WONG BAKER
WONGBAKER_NUMERICALRESPONSE: HURTS LITTLE MORE

## 2024-10-12 ASSESSMENT — PAIN DESCRIPTION - ORIENTATION
ORIENTATION: RIGHT
ORIENTATION: RIGHT

## 2024-10-12 NOTE — PROGRESS NOTES
"Elli Mejia is a 44 y.o. female on day 2 of admission presenting with Atrial flutter with rapid ventricular response (Multi).    Subjective   Resting comfortably.  Chest pain is improved.       Objective     Physical Exam   Gen: NAD   Neck: no JVD, carotid upstroke is brisk and without delay   Heart: rrr, s1s2+ no mrg   Lungs: CTA   Ext: warm no edema  Last Recorded Vitals  Blood pressure 112/71, pulse 84, temperature 36.4 °C (97.5 °F), temperature source Oral, resp. rate 23, height 1.676 m (5' 6\"), weight 140 kg (309 lb 8.4 oz), last menstrual period 09/29/2024, SpO2 97%.  Intake/Output last 3 Shifts:  I/O last 3 completed shifts:  In: 504.8 (3.6 mL/kg) [I.V.:104.8 (0.7 mL/kg); IV Piggyback:400]  Out: - (0 mL/kg)   Weight: 140.4 kg         Assessment/Plan   Assessment & Plan  Atrial flutter with rapid ventricular response (Multi)    Anemia    Hyperlipidemia    Hypothyroidism    Obesity, Class III, BMI 40-49.9 (morbid obesity) (Multi)    Depression with anxiety    Menorrhagia with regular cycle    #1 tachycardia.  All EKGs that I reviewed showing sinus tachycardia.  There is no clear evidence of atrial flutter.  Patient received cardioversion yesterday for flutter according to the ER physician.  Was given Lovenox one-time.  Her [id number] score is 1.  Patient currently on diltiazem drip.  Will stop diltiazem drip and start her on metoprolol 100 mg oral daily.  CT angio of the chest showed no pulmonary embolism.  Reviewed her CT angio no major calcifications in the coronaries.     2.  Chest pain.  Atypical chest pain associated with elevated white count.  No cough fever or chills.  Troponin slightly elevated after the cardioversion 43.  Will repeat another troponin this morning.  Will arrange for Lexiscan nuclear stress test as a workup for ischemia.  EKG does not suggest ischemia.  Currently still having mild chest pain.  Will obtain echocardiogram as workup for shortness of breath and chest pain.     3.  " Hypertension.  Will switch patient to metoprolol 100 mg oral daily and reassess.     4.  Anemia.  Patient has severe anemia.  Reports heavy menses.  Workup per primary team.  Recommend check iron studies.     5.  Elevated white count.  Patient denies having fever chills no dysuria.  Workup by primary team.     6.  Hypothyroidism.  Patient on levothyroxine.  Likely noncompliance with medications.  Management by primary team    10/12: Echocardiogram was unrevealing.  Normal left ventricular size and function and trivial valvular disease.  Awaiting day 2 of her stress testing.             Chaim Stack, DO

## 2024-10-12 NOTE — ED PROVIDER NOTES
History of Present Illness     History provided by: Patient  Limitations to History: None  External Records Reviewed with Brief Summary: None    HPI:  Elli Mejia is a 44 y.o. female presents with right shoulder pain.  Patient states that she fell down the stairs and struck her back against the handrails, unsure if she broke something or injured something.  Notes severe pain to her posterior right shoulder with increased pain on range of motion.  No other injuries.  Did not hit her head, no loss conscious.    Physical Exam   Triage vitals:  T 36.4 °C (97.5 °F)  HR 88  BP (!) 168/114  RR 18  O2 100 % None (Room air)    Physical Exam  Vitals and nursing note reviewed.   Constitutional:       General: She is not in acute distress.     Appearance: She is not ill-appearing.   HENT:      Head: Normocephalic and atraumatic.      Mouth/Throat:      Mouth: Mucous membranes are moist.      Pharynx: Oropharynx is clear.   Eyes:      Extraocular Movements: Extraocular movements intact.      Conjunctiva/sclera: Conjunctivae normal.      Pupils: Pupils are equal, round, and reactive to light.   Cardiovascular:      Rate and Rhythm: Normal rate and regular rhythm.   Pulmonary:      Effort: Pulmonary effort is normal. No respiratory distress.      Breath sounds: Normal breath sounds.   Abdominal:      General: There is no distension.      Palpations: Abdomen is soft.      Tenderness: There is no abdominal tenderness.   Musculoskeletal:         General: No swelling or deformity.      Right shoulder: Tenderness present. No deformity. Decreased range of motion (Secondary to pain). Normal pulse.      Cervical back: Normal range of motion and neck supple.   Skin:     General: Skin is warm and dry.      Capillary Refill: Capillary refill takes less than 2 seconds.   Neurological:      General: No focal deficit present.      Mental Status: She is alert and oriented to person, place, and time. Mental status is at baseline.    Psychiatric:         Mood and Affect: Mood normal.         Behavior: Behavior normal.          Medical Decision Making & ED Course   Medical Decision Makin y.o. female presents to the ED with right shoulder pain after fall. No deformity, no open wounds, or neurovascular compromise on exam. Most likely diagnosis from the mechanism and initial H&P is strain. No other musculoskeletal injuries noted in the H&P.    I have considered the following conditions during my assessment of this patient's shoulder pain: Dislocation (anterior, posterior), fracture (proximal humerus, clavicle, greater and lesser tuberosity, scapula, glenoid fossa), AC separation, neurovascular injury, muscle strain, ligament strain, rotator cuff injury.  No evidence of deformity to suggest dislocation.  X-rays without evidence of fracture, AC separation, scapular fracture, dislocation.  Patient treated with oral pain medications as well as IM pain medications.  Patient given prescription for pain medications to use at home.  Patient discharged in stable condition with return precautions.  ----  Social Determinants of Health which Significantly Impact Care: None identified     EKG Independent Interpretation: EKG not obtained    Independent Result Review and Interpretation: Relevant laboratory and radiographic results were reviewed and independently interpreted by myself.  As necessary, they are commented on in the ED Course.    Chronic conditions affecting the patient's care: As documented above in [facility]    The patient was discussed with the following consultants/services: None    Care Considerations: As documented above in [facility]    ED Course:  ED Course as of 10/12/24 1424   Wed Oct 02, 2024   0943 XR shoulder right 2+ views  IMPRESSION:  No acute fracture or dislocation.   [JM]      ED Course User Index  [JM] Armdia Perez MD         Diagnoses as of 10/12/24 1424   Acute pain of right shoulder       Procedures   Procedures    Armida  RAMONITA Perez MD  Emergency Medicine     Armida Perez MD  10/12/24 4520

## 2024-10-12 NOTE — CARE PLAN
Problem: Pain  Goal: Takes deep breaths with improved pain control throughout the shift  Outcome: Progressing  Goal: Turns in bed with improved pain control throughout the shift  Outcome: Progressing  Goal: Walks with improved pain control throughout the shift  Outcome: Progressing  Goal: Performs ADL's with improved pain control throughout shift  Outcome: Progressing  Goal: Participates in PT with improved pain control throughout the shift  Outcome: Progressing  Goal: Free from opioid side effects throughout the shift  Outcome: Progressing  Goal: Free from acute confusion related to pain meds throughout the shift  Outcome: Progressing   The patient's goals for the shift include rest    The clinical goals for the shift include pain management    Over the shift, the patient did not make progress toward the following goals. Barriers to progression include na. Recommendations to address these barriers include na.

## 2024-10-12 NOTE — PROGRESS NOTES
"Elli Mejia is a 44 y.o. female on day 2 of admission presenting with Atrial flutter with rapid ventricular response (Multi).    Subjective   Patient is complaining of right shoulder pain.  She had a fall a week ago.  Complaining of numbness in the arm.  Not able to lift it up.       Objective     Physical Exam  Vitals reviewed.   Constitutional:       Appearance: Normal appearance.   HENT:      Head: Normocephalic and atraumatic.      Right Ear: Tympanic membrane, ear canal and external ear normal.      Left Ear: Tympanic membrane, ear canal and external ear normal.      Nose: Nose normal.      Mouth/Throat:      Pharynx: Oropharynx is clear.   Eyes:      Extraocular Movements: Extraocular movements intact.      Conjunctiva/sclera: Conjunctivae normal.      Pupils: Pupils are equal, round, and reactive to light.   Cardiovascular:      Rate and Rhythm: Normal rate and regular rhythm.      Pulses: Normal pulses.      Heart sounds: Normal heart sounds.   Pulmonary:      Effort: Pulmonary effort is normal.      Breath sounds: Normal breath sounds.   Abdominal:      General: Abdomen is flat. Bowel sounds are normal.      Palpations: Abdomen is soft.   Musculoskeletal:         General: Tenderness present.      Cervical back: Normal range of motion and neck supple.      Comments: Tenderness right shoulder   Skin:     General: Skin is warm and dry.   Neurological:      General: No focal deficit present.      Mental Status: She is alert and oriented to person, place, and time.   Psychiatric:         Mood and Affect: Mood normal.         Last Recorded Vitals  Blood pressure 122/72, pulse 78, temperature 36.4 °C (97.5 °F), temperature source Oral, resp. rate 20, height 1.676 m (5' 6\"), weight 140 kg (309 lb 8.4 oz), last menstrual period 09/29/2024, SpO2 97%.  Intake/Output last 3 Shifts:  I/O last 3 completed shifts:  In: 504.8 (3.6 mL/kg) [I.V.:104.8 (0.7 mL/kg); IV Piggyback:400]  Out: - (0 mL/kg)   Weight: 140.4 kg "     Relevant Results               Scheduled medications  atorvastatin, 40 mg, oral, Nightly  chlorhexidine, 15 mL, Mouth/Throat, BID  docusate sodium, 100 mg, oral, BID  enoxaparin, 40 mg, subcutaneous, q12h TUNDE  levothyroxine, 200 mcg, oral, Daily  melatonin, 3 mg, oral, Nightly  methocarbamol, 750 mg, oral, BID  metoprolol succinate XL, 100 mg, oral, Daily  pantoprazole, 40 mg, oral, Daily before breakfast   Or  pantoprazole, 40 mg, intravenous, Daily before breakfast  piperacillin-tazobactam, 4.5 g, intravenous, q8h  polyethylene glycol, 17 g, oral, Daily  valACYclovir, 1,000 mg, oral, TID      Continuous medications     PRN medications  PRN medications: acetaminophen **OR** acetaminophen **OR** acetaminophen, benzocaine-menthol, cyclobenzaprine, dextromethorphan-guaifenesin, guaiFENesin, hydrocortisone, ondansetron ODT **OR** ondansetron, traMADol  Results for orders placed or performed during the hospital encounter of 10/10/24 (from the past 24 hour(s))   CBC   Result Value Ref Range    WBC 13.7 (H) 4.4 - 11.3 x10*3/uL    nRBC 0.1 (H) 0.0 - 0.0 /100 WBCs    RBC 2.53 (L) 4.00 - 5.20 x10*6/uL    Hemoglobin 7.4 (L) 12.0 - 16.0 g/dL    Hematocrit 23.7 (L) 36.0 - 46.0 %    MCV 94 80 - 100 fL    MCH 29.2 26.0 - 34.0 pg    MCHC 31.2 (L) 32.0 - 36.0 g/dL    RDW 15.8 (H) 11.5 - 14.5 %    Platelets 332 150 - 450 x10*3/uL   Comprehensive Metabolic Panel   Result Value Ref Range    Glucose 115 (H) 74 - 99 mg/dL    Sodium 137 136 - 145 mmol/L    Potassium 4.3 3.5 - 5.3 mmol/L    Chloride 105 98 - 107 mmol/L    Bicarbonate 24 21 - 32 mmol/L    Anion Gap 12 10 - 20 mmol/L    Urea Nitrogen 22 6 - 23 mg/dL    Creatinine 0.92 0.50 - 1.05 mg/dL    eGFR 80 >60 mL/min/1.73m*2    Calcium 8.7 8.6 - 10.3 mg/dL    Albumin 3.8 3.4 - 5.0 g/dL    Alkaline Phosphatase 50 33 - 110 U/L    Total Protein 6.4 6.4 - 8.2 g/dL    AST 22 9 - 39 U/L    Bilirubin, Total 0.3 0.0 - 1.2 mg/dL    ALT 18 7 - 45 U/L     Transthoracic Echo (TTE)  Complete    Result Date: 10/12/2024            [facility] [address], [place] [address]             Phone [phone] TRANSTHORACIC ECHOCARDIOGRAM REPORT Patient Name:     BRIEN PANDYA   Reading Physician:   [id number] Yovani Pandey MD Study Date:       10/11/2024           Ordering Provider:   [id number] YOVANI PANDEY MRN/PID:          [id number]             Fellow: Accession#:       [id number]         Nurse:               Melony Baker RN Date of           1980 / 44 years  Sonographer:         Rodney Pina RDCS Birth/Age: Gender:           F                    Additional Staff: Height:           167.64 cm            Admit Date: Weight:           137.44 kg            Admission Status:    Inpatient - Routine BSA / BMI:        2.39 m2 / 48.91      Department Location: [facility]                   kg/m2 Blood Pressure: 133 /96 mmHg Study Type:    TRANSTHORACIC ECHO (TTE) COMPLETE Diagnosis/ICD: Shortness of breath-R06.02 CPT Codes:     Echo Complete w Full [id number] Patient History: BMI:               Obese >30 Pertinent History: Palpitations, Chest Pain, Hyperlipidemia, HTN and A-Fib. Study Detail: The following Echo studies were performed: 2D, M-Mode, Doppler and               color flow. Technically challenging study due to body habitus.               Definity used as a contrast agent for endocardial border               definition. Total contrast used for this procedure was 2 mL via IV               push.  PHYSICIAN INTERPRETATION: Left Ventricle: The left ventricular systolic function is normal, with a visually estimated ejection fraction of 60-65%. There are no regional wall motion abnormalities. The left ventricular cavity size is normal. Spectral Doppler shows a normal pattern of left ventricular diastolic filling. Left Atrium: The left atrium is normal in size. Right Ventricle: The right ventricle was not well  visualized. There is normal right ventricular global systolic function. Right Atrium: The right atrium was not well visualized. Aortic Valve: The aortic valve is trileaflet. There is no evidence of aortic valve regurgitation. The peak instantaneous gradient of the aortic valve is 4.8 mmHg. Mitral Valve: The mitral valve was not well visualized. There is no evidence of mitral valve regurgitation. Tricuspid Valve: The tricuspid valve was not well visualized. There is trace tricuspid regurgitation. The right ventricular systolic pressure is unable to be estimated. Pulmonic Valve: The pulmonic valve is not well visualized. There is no indication of pulmonic valve regurgitation. Pericardium: No pericardial effusion noted. Aorta: The aortic root is normal.  CONCLUSIONS:  1. Poorly visualized anatomical structures due to suboptimal image quality.  2. The left ventricular systolic function is normal, with a visually estimated ejection fraction of 60-65%.  3. There is normal right ventricular global systolic function.  4. No evidence of mitral valve regurgitation.  5. Trace tricuspid regurgitation is visualized. QUANTITATIVE DATA SUMMARY:  LA VOLUME:                  Normal Ranges: LA Volume Index: 19.0 ml/m2  M-MODE MEASUREMENTS:         Normal Ranges: Ao Root:             3.20 cm (2.0-3.7cm) LAs:                 3.70 cm (2.7-4.0cm)  AORTA MEASUREMENTS:         Normal Ranges: Asc Ao, d:          3.10 cm (2.1-3.4cm)  LV SYSTOLIC FUNCTION BY 2D PLANIMETRY (MOD):                      Normal Ranges: EF-A4C View:    67 % (>=55%) EF-A2C View:    71 % EF-Biplane:     69 % EF-Visual:      63 % LV EF Reported: 63 %  LV DIASTOLIC FUNCTION:             Normal Ranges: MV Peak E:             0.72 m/s    (0.7-1.2 m/s) MV Peak A:             0.68 m/s    (0.42-0.7 m/s) E/A Ratio:             1.06        (1.0-2.2) MV e'                  0.104 m/s   (>8.0) MV lateral e'          0.12 m/s MV medial e'           0.09 m/s E/e' Ratio:             6.91        (<8.0) PulmV Sys Akshat:         40.10 cm/s PulmV Prasad Akshat:        40.10 cm/s PulmV S/D Akshat:         1.10 PulmV A Revs Akshat:      27.50 cm/s PulmV A Revs Dur:      103.00 msec  MITRAL VALVE:          Normal Ranges: MV DT:        320 msec (150-240msec)  AORTIC VALVE:           Normal Ranges: AoV Vmax:      1.09 m/s (<=1.7m/s) AoV Peak P.8 mmHg (<20mmHg) LVOT Max Akshat:  0.73 m/s (<=1.1m/s) LVOT VTI:      12.40 cm LVOT Diameter: 2.10 cm  (1.8-2.4cm) AoV Area,Vmax: 2.33 cm2 (2.5-4.5cm2)  RIGHT VENTRICLE: RV s' 0.11 m/s  TRICUSPID VALVE/RVSP:          Normal Ranges: Peak TR Velocity:     1.65 m/s RV Syst Pressure:     14 mmHg  (< 30mmHg)  PULMONIC VALVE:          Normal Ranges: PV Max Akshat:     1.0 m/s  (0.6-0.9m/s) PV Max PG:      3.6 mmHg  Pulmonary Veins: PulmV A Revs Dur: 103.00 msec PulmV A Revs Akshat: 27.50 cm/s PulmV Prasad Akshat:   40.10 cm/s PulmV S/D Akshat:    1.10 PulmV Sys Akshat:    40.10 cm/s  [id number] Yovani Pandey MD Electronically signed on 10/12/2024 at 7:48:55 AM  ** Final **                   Assessment/Plan   Assessment & Plan  Atrial flutter with rapid ventricular response (Multi)    Anemia    Hyperlipidemia    Hypothyroidism    Menorrhagia with regular cycle    Depression with anxiety    Obesity, Class III, BMI 40-49.9 (morbid obesity) (Multi)    Patient was given Lovenox and she is already anemic  Her H&H is dropping  Monitor  GI consult to rule out GI etiology for bleeding  X-ray shoulder negative  Get MRI of the shoulder  Cardiology input noted  No acute cardiac event  Echo normal  Patient noncompliant with the thyroid medication  Restart thyroid  Patient is less anxious today       I spent  minutes in the professional and overall care of this patient.      Nilam Sams MD

## 2024-10-12 NOTE — CARE PLAN
The patient's goals for the shift include rest    The clinical goals for the shift include safety    Over the shift, the patient did not make progress toward the following goals. Barriers to progression include none. Recommendations to address these barriers include none.

## 2024-10-13 ENCOUNTER — APPOINTMENT (OUTPATIENT)
Dept: RADIOLOGY | Facility: HOSPITAL | Age: 44
End: 2024-10-13
Payer: COMMERCIAL

## 2024-10-13 VITALS
WEIGHT: 293 LBS | SYSTOLIC BLOOD PRESSURE: 121 MMHG | BODY MASS INDEX: 47.09 KG/M2 | RESPIRATION RATE: 20 BRPM | HEIGHT: 66 IN | DIASTOLIC BLOOD PRESSURE: 81 MMHG | TEMPERATURE: 96.8 F | HEART RATE: 78 BPM | OXYGEN SATURATION: 99 %

## 2024-10-13 LAB
ANION GAP SERPL CALCULATED.3IONS-SCNC: 15 MMOL/L (ref 10–20)
BACTERIA BLD CULT: NORMAL
BACTERIA BLD CULT: NORMAL
BUN SERPL-MCNC: 16 MG/DL (ref 6–23)
CALCIUM SERPL-MCNC: 8.8 MG/DL (ref 8.6–10.3)
CHLORIDE SERPL-SCNC: 102 MMOL/L (ref 98–107)
CO2 SERPL-SCNC: 25 MMOL/L (ref 21–32)
CREAT SERPL-MCNC: 0.94 MG/DL (ref 0.5–1.05)
EGFRCR SERPLBLD CKD-EPI 2021: 78 ML/MIN/1.73M*2
ERYTHROCYTE [DISTWIDTH] IN BLOOD BY AUTOMATED COUNT: 15.8 % (ref 11.5–14.5)
GLUCOSE SERPL-MCNC: 121 MG/DL (ref 74–99)
HCT VFR BLD AUTO: 25.1 % (ref 36–46)
HGB BLD-MCNC: 7.7 G/DL (ref 12–16)
MCH RBC QN AUTO: 28.8 PG (ref 26–34)
MCHC RBC AUTO-ENTMCNC: 30.7 G/DL (ref 32–36)
MCV RBC AUTO: 94 FL (ref 80–100)
NRBC BLD-RTO: 0.6 /100 WBCS (ref 0–0)
PLATELET # BLD AUTO: 352 X10*3/UL (ref 150–450)
POTASSIUM SERPL-SCNC: 4.6 MMOL/L (ref 3.5–5.3)
RBC # BLD AUTO: 2.67 X10*6/UL (ref 4–5.2)
SODIUM SERPL-SCNC: 137 MMOL/L (ref 136–145)
WBC # BLD AUTO: 11 X10*3/UL (ref 4.4–11.3)

## 2024-10-13 PROCEDURE — 99232 SBSQ HOSP IP/OBS MODERATE 35: CPT | Performed by: INTERNAL MEDICINE

## 2024-10-13 PROCEDURE — 2500000004 HC RX 250 GENERAL PHARMACY W/ HCPCS (ALT 636 FOR OP/ED): Performed by: NURSE PRACTITIONER

## 2024-10-13 PROCEDURE — 73221 MRI JOINT UPR EXTREM W/O DYE: CPT | Mod: RIGHT SIDE | Performed by: RADIOLOGY

## 2024-10-13 PROCEDURE — 2060000001 HC INTERMEDIATE ICU ROOM DAILY

## 2024-10-13 PROCEDURE — 2500000004 HC RX 250 GENERAL PHARMACY W/ HCPCS (ALT 636 FOR OP/ED): Performed by: INTERNAL MEDICINE

## 2024-10-13 PROCEDURE — 85027 COMPLETE CBC AUTOMATED: CPT | Performed by: INTERNAL MEDICINE

## 2024-10-13 PROCEDURE — 2500000001 HC RX 250 WO HCPCS SELF ADMINISTERED DRUGS (ALT 637 FOR MEDICARE OP): Performed by: INTERNAL MEDICINE

## 2024-10-13 PROCEDURE — 80048 BASIC METABOLIC PNL TOTAL CA: CPT | Performed by: INTERNAL MEDICINE

## 2024-10-13 PROCEDURE — 97530 THERAPEUTIC ACTIVITIES: CPT | Mod: GP

## 2024-10-13 PROCEDURE — 73221 MRI JOINT UPR EXTREM W/O DYE: CPT | Mod: RT

## 2024-10-13 PROCEDURE — 36415 COLL VENOUS BLD VENIPUNCTURE: CPT | Performed by: INTERNAL MEDICINE

## 2024-10-13 ASSESSMENT — ENCOUNTER SYMPTOMS
BLOOD IN STOOL: 0
VOMITING: 0
CONSTIPATION: 0
FATIGUE: 0
CHILLS: 0
FEVER: 0
NAUSEA: 0
ABDOMINAL PAIN: 0
WEAKNESS: 0
DIARRHEA: 0

## 2024-10-13 ASSESSMENT — COGNITIVE AND FUNCTIONAL STATUS - GENERAL
CLIMB 3 TO 5 STEPS WITH RAILING: TOTAL
MOVING TO AND FROM BED TO CHAIR: A LITTLE
STANDING UP FROM CHAIR USING ARMS: A LITTLE
MOBILITY SCORE: 16
MOVING FROM LYING ON BACK TO SITTING ON SIDE OF FLAT BED WITH BEDRAILS: A LITTLE
TURNING FROM BACK TO SIDE WHILE IN FLAT BAD: A LITTLE
DAILY ACTIVITIY SCORE: 24
MOBILITY SCORE: 24
WALKING IN HOSPITAL ROOM: A LITTLE

## 2024-10-13 ASSESSMENT — PAIN SCALES - GENERAL
PAINLEVEL_OUTOF10: 8
PAINLEVEL_OUTOF10: 0 - NO PAIN
PAINLEVEL_OUTOF10: 5 - MODERATE PAIN
PAINLEVEL_OUTOF10: 3
PAINLEVEL_OUTOF10: 0 - NO PAIN
PAINLEVEL_OUTOF10: 7
PAINLEVEL_OUTOF10: 7
PAINLEVEL_OUTOF10: 5 - MODERATE PAIN

## 2024-10-13 ASSESSMENT — PAIN DESCRIPTION - ORIENTATION
ORIENTATION: RIGHT

## 2024-10-13 ASSESSMENT — PAIN DESCRIPTION - LOCATION
LOCATION: SHOULDER

## 2024-10-13 ASSESSMENT — PAIN - FUNCTIONAL ASSESSMENT
PAIN_FUNCTIONAL_ASSESSMENT: 0-10

## 2024-10-13 ASSESSMENT — PAIN SCALES - WONG BAKER: WONGBAKER_NUMERICALRESPONSE: HURTS LITTLE MORE

## 2024-10-13 ASSESSMENT — PAIN SCALES - PAIN ASSESSMENT IN ADVANCED DEMENTIA (PAINAD): TOTALSCORE: MEDICATION (SEE MAR)

## 2024-10-13 NOTE — PROGRESS NOTES
Physical Therapy    Physical Therapy Treatment    Patient Name: Elli Mejia  MRN: [id number]  Department: [address]  Room: [address] [place] [address]  Today's Date: 10/13/2024  Time Calculation  Start Time: 1021  Stop Time: 1036  Time Calculation (min): 15 min         Assessment/Plan   PT Assessment  PT Assessment Results: Decreased strength, Decreased endurance, Impaired balance, Decreased mobility, Impaired judgement, Decreased cognition, Decreased safety awareness, Impaired sensation, Decreased skin integrity, Pain, Obesity  Rehab Prognosis: Good  Barriers to Discharge: medical complexity  Evaluation/Treatment Tolerance: Patient tolerated treatment well, Patient limited by pain  Medical Staff Made Aware: Yes  Strengths: Ability to acquire knowledge, Attitude of self  End of Session Communication: Bedside nurse  Assessment Comment: patient demonstrated unsteadiness on feet with generalized weakness with improving mobility and balance. patient increased pain RUE impacting mobility. recommended continued skilled therapy upon discharge destination.  End of Session Patient Position: Bed, 3 rail up, Alarm off, not on at start of session  PT Plan  Inpatient/Swing Bed or Outpatient: Inpatient  PT Plan  Treatment/Interventions: Bed mobility, Transfer training, Gait training, Balance training, Strengthening, Endurance training, Therapeutic exercise, Therapeutic activity, Home exercise program  PT Plan: Ongoing PT  PT Frequency: 5 times per week  PT Discharge Recommendations: Low intensity level of continued care  PT Recommended Transfer Status: Assist x1  PT - OK to Discharge: Yes      General Visit Information:   PT  Visit  PT Received On: 10/13/24  Response to Previous Treatment: Patient with no complaints from previous session.  General  Reason for Referral: Impaired Mobility  Referred By: Dr. Sams  Past Medical History Relevant to Rehab: Thyroid Surg  Family/Caregiver Present: No  Prior to Session Communication: Bedside  nurse  Patient Position Received: Bed, 3 rail up, Alarm off, not on at start of session  Preferred Learning Style: verbal, visual  General Comment: 44 year old female admits with Acute CP, Afluter, and traumatic R rotator cuff injury.    Subjective   Precautions:       Vital Signs (Past 2hrs)                 Objective   Pain:     Cognition:     Coordination:     Postural Control:     Extremity/Trunk Assessments:     Activity Tolerance:     Treatments:  Bed Mobility  Bed Mobility: Yes  Bed Mobility 1  Bed Mobility 1: Supine to sitting  Level of Assistance 1: Close supervision  Bed Mobility Comments 1: HOB elevated and use of bed rail  Bed Mobility 2  Bed Mobility  2: Sitting to supine  Level of Assistance 2: Close supervision  Bed Mobility Comments 2: extra time needed for getting BLE into bed, able to perform self scooting for positioning and comfort.    Ambulation/Gait Training  Ambulation/Gait Training Performed: Yes  Ambulation/Gait Training 1  Surface 1: Level tile  Device 1: No device  Assistance 1: Contact guard  Quality of Gait 1: Decreased step length  Comments/Distance (ft) 1: patient ambulated x10 feet from EOB to bathroom and x30 feet within room using IV pole for balance.  Transfers  Transfer: Yes  Transfer 1  Transfer From 1: Sit to, Stand to  Transfer to 1: Sit, Stand  Technique 1: Sit to stand, Stand to sit  Transfer Level of Assistance 1: Close supervision  Trials/Comments 1: VC for hand placement and transfer sequencing.  Transfers 2  Transfer From 2: Toilet to  Transfer to 2: Sit  Technique 2: Sit to stand, Stand to sit  Transfer Level of Assistance 2: Close supervision  Trials/Comments 2: use of grab bar in bathroom    Outcome Measures:  [facility] Basic Mobility  Turning from your back to your side while in a flat bed without using bedrails: A little  Moving from lying on your back to sitting on the side of a flat bed without using bedrails: A little  Moving to and from bed to chair (including a  wheelchair): A little  Standing up from a chair using your arms (e.g. wheelchair or bedside chair): A little  To walk in hospital room: A little  Climbing 3-5 steps with railing: Total  Basic Mobility - Total Score: 16    Education Documentation  Mobility Training, taught by Glenis Varner, PT at 10/13/2024 11:06 AM.  Learner: Patient  Readiness: Acceptance  Method: Explanation  Response: Needs Reinforcement, Verbalizes Understanding    Education Comments  No comments found.        OP EDUCATION:       Encounter Problems       Encounter Problems (Active)       Balance       Standing Balance (Progressing)       Start:  10/11/24    Expected End:  10/25/24       Pt will demonstrate good static standing balance to promote safe participation with out of bed activity, transfers, and mobility              Mobility       Ambulation STG (Progressing)       Start:  10/11/24    Expected End:  10/25/24       Pt will ambulate 50' modified independent assist with LRD to promote safe home mobility           Ambulation LTG (Progressing)       Start:  10/11/24    Expected End:  10/25/24       Pt will ambulate 300' independent assist with no device to promote safe home mobility              PT Transfers       Supine to sit (Progressing)       Start:  10/11/24    Expected End:  10/25/24       Pt will transfer supine to sitting at edge of bed with modified independent assist to promote acute care out of bed activity           Sit to stand (Progressing)       Start:  10/11/24    Expected End:  10/25/24       Pt will transfer sit to standing position with modified independent assist and LRD to promote safe out of bed activity              Safety       Safe Mobility Techniques (Progressing)       Start:  10/11/24    Expected End:  10/25/24       Pt will correctly identify and demonstrate safe mobility techniques to reduce their risks for falls during their acute care stay

## 2024-10-13 NOTE — CONSULTS
Consults    Reason For Consult  JUAN     History Of Present Illness  Elli Mejia is a 44 y.o. female presenting with chest pain and palpitations. Found evidence normocytic anemia with hgb 7.4. Ferritin 22. She denies black, tarry, bloody stools. Normally had BM every 3 days. She denies nose bleeds. She reports heavy menstruation with pad changing every 1.5 hours x 7 days monthly. She denies prior EGD/colonoscopy. Grandfather had colon cancer      Past Medical History  She has no past medical history on file.    Surgical History  She has no past surgical history on file.     Social History  She reports that she has never smoked. She has never used smokeless tobacco. She reports that she does not drink alcohol and does not use drugs.    Family History  No family history on file.     Allergies  Patient has no known allergies.    Review of Systems   Constitutional:  Negative for chills, fatigue and fever.   Gastrointestinal:  Negative for abdominal pain, blood in stool, constipation, diarrhea, nausea and vomiting.   Neurological:  Negative for weakness.        Physical Exam  Constitutional:       Appearance: Normal appearance. She is obese.   HENT:      Head: Normocephalic and atraumatic.      Mouth/Throat:      Mouth: Mucous membranes are moist.   Pulmonary:      Effort: Pulmonary effort is normal.   Abdominal:      General: There is no distension.      Palpations: Abdomen is soft.      Tenderness: There is no abdominal tenderness. There is no guarding.   Musculoskeletal:         General: Normal range of motion.      Cervical back: Normal range of motion.   Skin:     General: Skin is warm and dry.   Neurological:      General: No focal deficit present.      Mental Status: She is alert. Mental status is at baseline.   Psychiatric:         Mood and Affect: Mood normal.          Last Recorded Vitals  Blood pressure 124/72, pulse 77, temperature 37 °C (98.6 °F), temperature source Temporal, resp. rate 16, height  "1.676 m (5' 6\"), weight 142 kg (313 lb 8 oz), last menstrual period 09/29/2024, SpO2 100%.    Relevant Results  Results for orders placed or performed during the hospital encounter of 10/10/24 (from the past 24 hour(s))   CBC   Result Value Ref Range    WBC 11.0 4.4 - 11.3 x10*3/uL    nRBC 0.6 (H) 0.0 - 0.0 /100 WBCs    RBC 2.67 (L) 4.00 - 5.20 x10*6/uL    Hemoglobin 7.7 (L) 12.0 - 16.0 g/dL    Hematocrit 25.1 (L) 36.0 - 46.0 %    MCV 94 80 - 100 fL    MCH 28.8 26.0 - 34.0 pg    MCHC 30.7 (L) 32.0 - 36.0 g/dL    RDW 15.8 (H) 11.5 - 14.5 %    Platelets 352 150 - 450 x10*3/uL   Basic Metabolic Panel   Result Value Ref Range    Glucose 121 (H) 74 - 99 mg/dL    Sodium 137 136 - 145 mmol/L    Potassium 4.6 3.5 - 5.3 mmol/L    Chloride 102 98 - 107 mmol/L    Bicarbonate 25 21 - 32 mmol/L    Anion Gap 15 10 - 20 mmol/L    Urea Nitrogen 16 6 - 23 mg/dL    Creatinine 0.94 0.50 - 1.05 mg/dL    eGFR 78 >60 mL/min/1.73m*2    Calcium 8.8 8.6 - 10.3 mg/dL     US pelvis    Result Date: 10/13/2024  Interpreted By:  Arabella Adam, STUDY: US PELVIS; 10/12/2024 11:35 am   INDICATION: Signs/Symptoms:Menorrhagia.   COMPARISON: None.   ACCESSION NUMBER(S): [id number]   ORDERING CLINICIAN: AMPARO GHOSH   TECHNIQUE: Grayscale and color Doppler imaging of the pelvis were performed. Transabdominal technique was utilized. Patient refused transvaginal study.   FINDINGS: Uterus: Size: 13.0 cm x 4.8 cm x 8.0 cm. No uterine masses noted. Endometrial Thickness: 0.7 cm. Ovaries: Neither the left nor right ovary could be visualized transabdominally.   There is no free fluid in the pelvis.       Enlarged uterus without mass. Normal endometrial thickness of 7 mm.   Nonvisualization of the ovaries transabdominally most likely due to obscuration by overlying bowel gas and body habitus.   MACRO: None.   Signed by: Arabella Adam 10/13/2024 5:58 AM Dictation workstation:   [id number]    Transthoracic Echo (TTE) Complete    Result Date: 10/12/2024            " [facility] [address] [id number]             Phone [phone] TRANSTHORACIC ECHOCARDIOGRAM REPORT Patient Name:     BRIEN ROJO MUMTAZ   Reading Physician:   Jake Pandey MD Study Date:       10/11/2024           Ordering Provider:   Jake PANDEY MRN/PID:          [id number]             Fellow: Accession#:       [id number]         Nurse:               Melony Baker RN Date of           1980 / 44 years  Sonographer:         Rodney Pina [facility] Birth/Age: Gender:           F                    Additional Staff: Height:           167.64 cm            Admit Date: Weight:           137.44 kg            Admission Status:    Inpatient - Routine BSA / BMI:        2.39 m2 / 48.91      Department Location: [facility]                   kg/m2 Blood Pressure: 133 /96 mmHg Study Type:    TRANSTHORACIC ECHO (TTE) COMPLETE Diagnosis/ICD: Shortness of breath-R06.02 CPT Codes:     Echo Complete w Full Doppler-56227 Patient History: BMI:               Obese >30 Pertinent History: Palpitations, Chest Pain, Hyperlipidemia, HTN and A-Fib. Study Detail: The following Echo studies were performed: 2D, M-Mode, Doppler and               color flow. Technically challenging study due to body habitus.               Definity used as a contrast agent for endocardial border               definition. Total contrast used for this procedure was 2 mL via IV               push.  PHYSICIAN INTERPRETATION: Left Ventricle: The left ventricular systolic function is normal, with a visually estimated ejection fraction of 60-65%. There are no regional wall motion abnormalities. The left ventricular cavity size is normal. Spectral Doppler shows a normal pattern of left ventricular diastolic filling. Left Atrium: The left atrium is normal in size. Right Ventricle: The right ventricle was not well visualized. There is normal right ventricular global  systolic function. Right Atrium: The right atrium was not well visualized. Aortic Valve: The aortic valve is trileaflet. There is no evidence of aortic valve regurgitation. The peak instantaneous gradient of the aortic valve is 4.8 mmHg. Mitral Valve: The mitral valve was not well visualized. There is no evidence of mitral valve regurgitation. Tricuspid Valve: The tricuspid valve was not well visualized. There is trace tricuspid regurgitation. The right ventricular systolic pressure is unable to be estimated. Pulmonic Valve: The pulmonic valve is not well visualized. There is no indication of pulmonic valve regurgitation. Pericardium: No pericardial effusion noted. Aorta: The aortic root is normal.  CONCLUSIONS:  1. Poorly visualized anatomical structures due to suboptimal image quality.  2. The left ventricular systolic function is normal, with a visually estimated ejection fraction of 60-65%.  3. There is normal right ventricular global systolic function.  4. No evidence of mitral valve regurgitation.  5. Trace tricuspid regurgitation is visualized. QUANTITATIVE DATA SUMMARY:  LA VOLUME:                  Normal Ranges: LA Volume Index: 19.0 ml/m2  M-MODE MEASUREMENTS:         Normal Ranges: Ao Root:             3.20 cm (2.0-3.7cm) LAs:                 3.70 cm (2.7-4.0cm)  AORTA MEASUREMENTS:         Normal Ranges: Asc Ao, d:          3.10 cm (2.1-3.4cm)  LV SYSTOLIC FUNCTION BY 2D PLANIMETRY (MOD):                      Normal Ranges: EF-A4C View:    67 % (>=55%) EF-A2C View:    71 % EF-Biplane:     69 % EF-Visual:      63 % LV EF Reported: 63 %  LV DIASTOLIC FUNCTION:             Normal Ranges: MV Peak E:             0.72 m/s    (0.7-1.2 m/s) MV Peak A:             0.68 m/s    (0.42-0.7 m/s) E/A Ratio:             1.06        (1.0-2.2) MV e'                  0.104 m/s   (>8.0) MV lateral e'          0.12 m/s MV medial e'           0.09 m/s E/e' Ratio:            6.91        (<8.0) PulmV Sys Akshat:         40.10 cm/s  PulmV Prasad Akshat:        40.10 cm/s PulmV S/D Akshat:         1.10 PulmV A Revs Akshat:      27.50 cm/s PulmV A Revs Dur:      103.00 msec  MITRAL VALVE:          Normal Ranges: MV DT:        320 msec (150-240msec)  AORTIC VALVE:           Normal Ranges: AoV Vmax:      1.09 m/s (<=1.7m/s) AoV Peak P.8 mmHg (<20mmHg) LVOT Max Akshat:  0.73 m/s (<=1.1m/s) LVOT VTI:      12.40 cm LVOT Diameter: 2.10 cm  (1.8-2.4cm) AoV Area,Vmax: 2.33 cm2 (2.5-4.5cm2)  RIGHT VENTRICLE: RV s' 0.11 m/s  TRICUSPID VALVE/RVSP:          Normal Ranges: Peak TR Velocity:     1.65 m/s RV Syst Pressure:     14 mmHg  (< 30mmHg)  PULMONIC VALVE:          Normal Ranges: PV Max Akshat:     1.0 m/s  (0.6-0.9m/s) PV Max PG:      3.6 mmHg  Pulmonary Veins: PulmV A Revs Dur: 103.00 msec PulmV A Revs Akshat: 27.50 cm/s PulmV Prasad Akshat:   40.10 cm/s PulmV S/D Akshat:    1.10 PulmV Sys Akshat:    40.10 cm/s  [id number] Yovani Pandey MD Electronically signed on 10/12/2024 at 7:48:55 AM  ** Final **     ECG 12 lead    Result Date: 10/11/2024  Sinus tachycardia Nonspecific T wave abnormality Abnormal ECG When compared with ECG of 10-OCT-2024 16:19, (unconfirmed) Nonspecific T wave abnormality now evident in Inferior leads    ECG 12 lead    Result Date: 10/11/2024  1 Poor data quality, interpretation may be adversely affected Sinus tachycardia Nonspecific ST and T wave abnormality Abnormal ECG No previous ECGs available    CT angio chest for pulmonary embolism    Result Date: 10/10/2024  Interpreted By:  Nannette Isaacs, STUDY: CT ANGIO CHEST FOR PULMONARY EMBOLISM;  10/10/2024 5:54 pm   INDICATION: Signs/Symptoms:tachycardia.     COMPARISON: None.   ACCESSION NUMBER(S): [id number]   ORDERING CLINICIAN: YAMIL PARDO   TECHNIQUE: CT of the chest was performed. Sagittal and coronal reconstructions were generated. 75 ML Omnipaque 350 intravenous contrast given for the examination.  Multiplanar reconstructions of the pulmonary vessels were created on an independent workstation and  provided for review.   FINDINGS:     CHEST WALL AND LOWER NECK: No significant axillary lymphadenopathy.   MEDIASTINUM AND KRISTINA:  No significant mediastinal or hilar lymphadenopathy.   HEART AND VESSELS:  No pulmonary artery filling defect to suggest PE. The heart is normal in size. No significant pericardial effusion. No thoracic aortic aneurysm.   LUNGS, PLEURA, LARGE AIRWAYS: 6 mm left lower lobe nodule image 192/316. 3 mm pleural-based left mid chest nodule image 149/316. Mild presumed dependent atelectasis in both lung bases. No pleural effusion or pneumothorax. The central airways are patent.   UPPER ABDOMEN:  The included liver is diffusely hypodense/fatty infiltrated with subtle dense presumed fatty sparing near the sarah hepatis. Isodense presumed splenule adjacent to the inferior pole of the spleen.   BONES:  No focal concerning lytic or blastic osseous lesions.       No evidence of PE.   Left lung nodules measuring up to 6 mm. See below.   Probable diffuse fatty infiltration of the liver incidentally noted.   MACRO: Incidental Finding:  Multiple solid non-calcified pulmonary nodules measuring up to 6-8 mm.  (**-YCF-**)   Instructions:  Consider follow up non contrast chest CT at 3-6 months, then consider CT chest at 18-24 months. (Attila Márquezhomikala et al., Guidelines for management of incidental pulmonary nodules detected on CT images: From the Fleischner Society 2017, Radiology. 2017 Jul;284 (1):228-243.) [email or web address].[email or web address]   Signed by: Nannette Isaacs 10/10/2024 6:19 PM Dictation workstation:   [id number]    XR chest 1 view    Result Date: 10/10/2024  Interpreted By:  Misti Sears, STUDY: Chest, single AP view.   INDICATION: Signs/Symptoms:chest pain, shortness of breath, aflutter.   COMPARISON: None   ACCESSION NUMBER(S): [id number]   ORDERING CLINICIAN: YAMIL PARDO   FINDINGS: The cardiac silhouette size is within normal limits. There is no focal consolidation, edema or pneumothorax. No  sizeable pleural effusion. No acute osseous abnormality.       1. No acute cardiopulmonary process.   MACRO: None.   Signed by: Misti Sears 10/10/2024 5:09 PM Dictation workstation:   [id number]    XR shoulder right 2+ views    Result Date: 10/2/2024  Interpreted By:  Mata Schmitt, STUDY: XR SHOULDER RIGHT 2+ VIEWS; 10/2/2024 9:32 am   INDICATION: Signs/Symptoms:pain.   COMPARISON: None.   ACCESSION NUMBER(S): [id number]   ORDERING CLINICIAN: SOY JERONIMO   FINDINGS: No acute fracture or dislocation. No lytic or blastic lesions or periosteal reaction. No significant degenerative changes..       No acute fracture or dislocation.     Signed by: Mata Schmitt 10/2/2024 9:40 AM Dictation workstation:   [id number]        Assessment/Plan     Anemia, Chest Pain   Normocytic anemia with ferritin 22. Likely JUAN. She reports heavy menstration monthly. This seems more likely source than GI etiology but will plan outpatient FU    No dark, tarry, bloody stools. Given her family hx colon cancer and new anemia, recommend outpatient EGD/colonoscopy   I spent 30 minutes in the professional and overall care of this patient.

## 2024-10-13 NOTE — PROGRESS NOTES
"Elli Mejia is a 44 y.o. female on day 3 of admission presenting with Atrial flutter with rapid ventricular response (Multi).    Subjective   Resting comfortably       Objective     Physical Exam   Gen: NAD   Neck: no JVD, carotid upstroke is brisk and without delay   Heart: rrr, s1s2+ no mrg   Lungs: CTA   Ext: warm no edema  Last Recorded Vitals  Blood pressure 124/72, pulse 77, temperature 37 °C (98.6 °F), temperature source Temporal, resp. rate 16, height 1.676 m (5' 6\"), weight 142 kg (313 lb 8 oz), last menstrual period 09/29/2024, SpO2 100%.  Intake/Output last 3 Shifts:  I/O last 3 completed shifts:  In: 600 (4.2 mL/kg) [IV Piggyback:600]  Out: - (0 mL/kg)   Weight: 142.2 kg         Assessment/Plan   Assessment & Plan  Atrial flutter with rapid ventricular response (Multi)    Anemia    Hyperlipidemia    Hypothyroidism    Obesity, Class III, BMI 40-49.9 (morbid obesity) (Multi)    Depression with anxiety    Menorrhagia with regular cycle    #1 tachycardia.  All EKGs that I reviewed showing sinus tachycardia.  There is no clear evidence of atrial flutter.  Patient received cardioversion yesterday for flutter according to the ER physician.  Was given Lovenox one-time.  Her [id number] score is 1.  Patient currently on diltiazem drip.  Will stop diltiazem drip and start her on metoprolol 100 mg oral daily.  CT angio of the chest showed no pulmonary embolism.  Reviewed her CT angio no major calcifications in the coronaries.     2.  Chest pain.  Atypical chest pain associated with elevated white count.  No cough fever or chills.  Troponin slightly elevated after the cardioversion 43.  Will repeat another troponin this morning.  Will arrange for Lexiscan nuclear stress test as a workup for ischemia.  EKG does not suggest ischemia.  Currently still having mild chest pain.  Will obtain echocardiogram as workup for shortness of breath and chest pain.     3.  Hypertension.  Will switch patient to metoprolol 100 " mg oral daily and reassess.     4.  Anemia.  Patient has severe anemia.  Reports heavy menses.  Workup per primary team.  Recommend check iron studies.     5.  Elevated white count.  Patient denies having fever chills no dysuria.  Workup by primary team.     6.  Hypothyroidism.  Patient on levothyroxine.  Likely noncompliance with medications.  Management by primary team     10/12: Echocardiogram was unrevealing.  Normal left ventricular size and function and trivial valvular disease.  Awaiting day 2 of her stress testing.    10/13: Denies further chest pain.  Awaiting  final portion of her stress test.  Will follow.               Chaim Stack, DO

## 2024-10-13 NOTE — CARE PLAN
The patient's goals for the shift include rest    The clinical goals for the shift include pain management    Over the shift, the patient did not make progress toward the following goals. Barriers to progression include none. Recommendations to address these barriers include none.

## 2024-10-13 NOTE — PROGRESS NOTES
"Elli Mejia is a 44 y.o. female on day 3 of admission presenting with Atrial flutter with rapid ventricular response (Multi).    Subjective   Patient is complaining of right shoulder pain.  She had a fall a week ago.  Complaining of numbness in the arm.  Not able to lift it up.       Objective     Physical Exam  Vitals reviewed.   Constitutional:       Appearance: Normal appearance.   HENT:      Head: Normocephalic and atraumatic.      Right Ear: Tympanic membrane, ear canal and external ear normal.      Left Ear: Tympanic membrane, ear canal and external ear normal.      Nose: Nose normal.      Mouth/Throat:      Pharynx: Oropharynx is clear.   Eyes:      Extraocular Movements: Extraocular movements intact.      Conjunctiva/sclera: Conjunctivae normal.      Pupils: Pupils are equal, round, and reactive to light.   Cardiovascular:      Rate and Rhythm: Normal rate and regular rhythm.      Pulses: Normal pulses.      Heart sounds: Normal heart sounds.   Pulmonary:      Effort: Pulmonary effort is normal.      Breath sounds: Normal breath sounds.   Abdominal:      General: Abdomen is flat. Bowel sounds are normal.      Palpations: Abdomen is soft.   Musculoskeletal:         General: Tenderness present.      Cervical back: Normal range of motion and neck supple.      Comments: Tenderness right shoulder   Skin:     General: Skin is warm and dry.   Neurological:      General: No focal deficit present.      Mental Status: She is alert and oriented to person, place, and time.   Psychiatric:         Mood and Affect: Mood normal.         Last Recorded Vitals  Blood pressure 111/75, pulse 77, temperature 35.8 °C (96.4 °F), temperature source Temporal, resp. rate 14, height 1.676 m (5' 6\"), weight 142 kg (313 lb 8 oz), last menstrual period 09/29/2024, SpO2 99%.  Intake/Output last 3 Shifts:  I/O last 3 completed shifts:  In: 600 (4.2 mL/kg) [IV Piggyback:600]  Out: - (0 mL/kg)   Weight: 142.2 kg     Relevant " Results               Scheduled medications  atorvastatin, 40 mg, oral, Nightly  chlorhexidine, 15 mL, Mouth/Throat, BID  docusate sodium, 100 mg, oral, BID  levothyroxine, 200 mcg, oral, Daily  melatonin, 3 mg, oral, Nightly  methocarbamol, 750 mg, oral, BID  metoprolol succinate XL, 100 mg, oral, Daily  pantoprazole, 40 mg, oral, Daily before breakfast   Or  pantoprazole, 40 mg, intravenous, Daily before breakfast  piperacillin-tazobactam, 4.5 g, intravenous, q8h  polyethylene glycol, 17 g, oral, Daily  valACYclovir, 1,000 mg, oral, TID      Continuous medications     PRN medications  PRN medications: acetaminophen **OR** acetaminophen **OR** acetaminophen, benzocaine-menthol, cyclobenzaprine, dextromethorphan-guaifenesin, guaiFENesin, hydrocortisone, ondansetron ODT **OR** ondansetron, traMADol  Results for orders placed or performed during the hospital encounter of 10/10/24 (from the past 24 hour(s))   CBC   Result Value Ref Range    WBC 11.0 4.4 - 11.3 x10*3/uL    nRBC 0.6 (H) 0.0 - 0.0 /100 WBCs    RBC 2.67 (L) 4.00 - 5.20 x10*6/uL    Hemoglobin 7.7 (L) 12.0 - 16.0 g/dL    Hematocrit 25.1 (L) 36.0 - 46.0 %    MCV 94 80 - 100 fL    MCH 28.8 26.0 - 34.0 pg    MCHC 30.7 (L) 32.0 - 36.0 g/dL    RDW 15.8 (H) 11.5 - 14.5 %    Platelets 352 150 - 450 x10*3/uL   Basic Metabolic Panel   Result Value Ref Range    Glucose 121 (H) 74 - 99 mg/dL    Sodium 137 136 - 145 mmol/L    Potassium 4.6 3.5 - 5.3 mmol/L    Chloride 102 98 - 107 mmol/L    Bicarbonate 25 21 - 32 mmol/L    Anion Gap 15 10 - 20 mmol/L    Urea Nitrogen 16 6 - 23 mg/dL    Creatinine 0.94 0.50 - 1.05 mg/dL    eGFR 78 >60 mL/min/1.73m*2    Calcium 8.8 8.6 - 10.3 mg/dL     US pelvis    Result Date: 10/13/2024  Interpreted By:  Arabella Adam, STUDY: US PELVIS; 10/12/2024 11:35 am   INDICATION: Signs/Symptoms:Menorrhagia.   COMPARISON: None.   ACCESSION NUMBER(S): [id number]   ORDERING CLINICIAN: AMPARO GHOSH   TECHNIQUE: Grayscale and color Doppler imaging of the  pelvis were performed. Transabdominal technique was utilized. Patient refused transvaginal study.   FINDINGS: Uterus: Size: 13.0 cm x 4.8 cm x 8.0 cm. No uterine masses noted. Endometrial Thickness: 0.7 cm. Ovaries: Neither the left nor right ovary could be visualized transabdominally.   There is no free fluid in the pelvis.       Enlarged uterus without mass. Normal endometrial thickness of 7 mm.   Nonvisualization of the ovaries transabdominally most likely due to obscuration by overlying bowel gas and body habitus.   MACRO: None.   Signed by: Arabella Adam 10/13/2024 5:58 AM Dictation workstation:   [id number]                  Assessment/Plan   Assessment & Plan  Atrial flutter with rapid ventricular response (Multi)    Anemia    Hyperlipidemia    Hypothyroidism    Menorrhagia with regular cycle    Depression with anxiety    Obesity, Class III, BMI 40-49.9 (morbid obesity) (Multi)    H/H stable  GI input noted  X-ray shoulder negative  MRI of the shoulder results pending  Cardiology input noted  No acute cardiac event  Echo normal  Patient noncompliant with the thyroid medication  Restart thyroid  Patient wants to wait for dc till tomorrow after MRI results       I spent  minutes in the professional and overall care of this patient.      Nilam Sams MD

## 2024-10-14 ENCOUNTER — APPOINTMENT (OUTPATIENT)
Dept: RADIOLOGY | Facility: HOSPITAL | Age: 44
End: 2024-10-14
Payer: COMMERCIAL

## 2024-10-14 PROBLEM — R07.9 ACUTE CHEST PAIN: Status: ACTIVE | Noted: 2024-10-10

## 2024-10-14 LAB
ANION GAP SERPL CALCULATED.3IONS-SCNC: 18 MMOL/L (ref 10–20)
ATRIAL RATE: 141 BPM
BUN SERPL-MCNC: 16 MG/DL (ref 6–23)
CALCIUM SERPL-MCNC: 9.2 MG/DL (ref 8.6–10.3)
CHLORIDE SERPL-SCNC: 102 MMOL/L (ref 98–107)
CO2 SERPL-SCNC: 22 MMOL/L (ref 21–32)
CREAT SERPL-MCNC: 1.05 MG/DL (ref 0.5–1.05)
EGFRCR SERPLBLD CKD-EPI 2021: 68 ML/MIN/1.73M*2
ERYTHROCYTE [DISTWIDTH] IN BLOOD BY AUTOMATED COUNT: 16.1 % (ref 11.5–14.5)
GLUCOSE SERPL-MCNC: 130 MG/DL (ref 74–99)
HCT VFR BLD AUTO: 26.4 % (ref 36–46)
HGB BLD-MCNC: 8.3 G/DL (ref 12–16)
MCH RBC QN AUTO: 29.5 PG (ref 26–34)
MCHC RBC AUTO-ENTMCNC: 31.4 G/DL (ref 32–36)
MCV RBC AUTO: 94 FL (ref 80–100)
NRBC BLD-RTO: 0.8 /100 WBCS (ref 0–0)
P AXIS: 43 DEGREES
P OFFSET: 204 MS
P ONSET: 160 MS
PLATELET # BLD AUTO: 403 X10*3/UL (ref 150–450)
POTASSIUM SERPL-SCNC: 4.5 MMOL/L (ref 3.5–5.3)
PR INTERVAL: 122 MS
Q ONSET: 221 MS
QRS COUNT: 24 BEATS
QRS DURATION: 68 MS
QT INTERVAL: 284 MS
QTC CALCULATION(BAZETT): 434 MS
QTC FREDERICIA: 377 MS
R AXIS: 19 DEGREES
RBC # BLD AUTO: 2.81 X10*6/UL (ref 4–5.2)
SODIUM SERPL-SCNC: 137 MMOL/L (ref 136–145)
T AXIS: 100 DEGREES
T OFFSET: 363 MS
VENTRICULAR RATE: 141 BPM
WBC # BLD AUTO: 14.6 X10*3/UL (ref 4.4–11.3)

## 2024-10-14 PROCEDURE — 97110 THERAPEUTIC EXERCISES: CPT | Mod: GP,CQ

## 2024-10-14 PROCEDURE — 99232 SBSQ HOSP IP/OBS MODERATE 35: CPT | Performed by: INTERNAL MEDICINE

## 2024-10-14 PROCEDURE — 99233 SBSQ HOSP IP/OBS HIGH 50: CPT | Performed by: INTERNAL MEDICINE

## 2024-10-14 PROCEDURE — A9502 TC99M TETROFOSMIN: HCPCS | Performed by: INTERNAL MEDICINE

## 2024-10-14 PROCEDURE — 78452 HT MUSCLE IMAGE SPECT MULT: CPT | Performed by: RADIOLOGY

## 2024-10-14 PROCEDURE — 85027 COMPLETE CBC AUTOMATED: CPT | Performed by: INTERNAL MEDICINE

## 2024-10-14 PROCEDURE — 80048 BASIC METABOLIC PNL TOTAL CA: CPT | Performed by: INTERNAL MEDICINE

## 2024-10-14 PROCEDURE — 3430000001 HC RX 343 DIAGNOSTIC RADIOPHARMACEUTICALS: Performed by: INTERNAL MEDICINE

## 2024-10-14 PROCEDURE — 97116 GAIT TRAINING THERAPY: CPT | Mod: GP,CQ

## 2024-10-14 PROCEDURE — 2060000001 HC INTERMEDIATE ICU ROOM DAILY

## 2024-10-14 PROCEDURE — 2500000001 HC RX 250 WO HCPCS SELF ADMINISTERED DRUGS (ALT 637 FOR MEDICARE OP): Performed by: INTERNAL MEDICINE

## 2024-10-14 PROCEDURE — 2500000004 HC RX 250 GENERAL PHARMACY W/ HCPCS (ALT 636 FOR OP/ED): Performed by: INTERNAL MEDICINE

## 2024-10-14 ASSESSMENT — PAIN SCALES - GENERAL
PAINLEVEL_OUTOF10: 6
PAINLEVEL_OUTOF10: 2
PAINLEVEL_OUTOF10: 7
PAINLEVEL_OUTOF10: 8
PAINLEVEL_OUTOF10: 3
PAINLEVEL_OUTOF10: 5 - MODERATE PAIN
PAINLEVEL_OUTOF10: 3

## 2024-10-14 ASSESSMENT — COGNITIVE AND FUNCTIONAL STATUS - GENERAL
MOBILITY SCORE: 16
MOBILITY SCORE: 24
CLIMB 3 TO 5 STEPS WITH RAILING: TOTAL
STANDING UP FROM CHAIR USING ARMS: A LITTLE
MOVING TO AND FROM BED TO CHAIR: A LITTLE
WALKING IN HOSPITAL ROOM: A LITTLE
MOVING FROM LYING ON BACK TO SITTING ON SIDE OF FLAT BED WITH BEDRAILS: A LITTLE
DAILY ACTIVITIY SCORE: 24
TURNING FROM BACK TO SIDE WHILE IN FLAT BAD: A LITTLE

## 2024-10-14 ASSESSMENT — PAIN SCALES - WONG BAKER
WONGBAKER_NUMERICALRESPONSE: HURTS EVEN MORE

## 2024-10-14 ASSESSMENT — PAIN DESCRIPTION - DESCRIPTORS: DESCRIPTORS: SHARP;STABBING

## 2024-10-14 ASSESSMENT — PAIN DESCRIPTION - ORIENTATION
ORIENTATION: RIGHT
ORIENTATION: RIGHT

## 2024-10-14 ASSESSMENT — PAIN - FUNCTIONAL ASSESSMENT
PAIN_FUNCTIONAL_ASSESSMENT: 0-10

## 2024-10-14 ASSESSMENT — PAIN DESCRIPTION - LOCATION
LOCATION: SHOULDER
LOCATION: SHOULDER

## 2024-10-14 NOTE — PROGRESS NOTES
Physical Therapy    Physical Therapy Treatment    Patient Name: Elli Mejia  MRN: [id number]  Department: [address]  Room: [address] [place], [place] [address]  Today's Date: 10/14/2024  Time Calculation  Start Time: 0950  Stop Time: 1015  Time Calculation (min): 25 min         Assessment/Plan   PT Assessment  Barriers to Discharge: medical complexity  End of Session Communication: Bedside nurse  Assessment Comment: Increased gait. No unsteadiness with gait/transfers R shoulder pain with mobility  End of Session Patient Position: Bed, 3 rail up, Alarm off, not on at start of session (Needs at reach)  PT Plan  Inpatient/Swing Bed or Outpatient: Inpatient  PT Plan  Treatment/Interventions: Bed mobility, Transfer training, Gait training, Balance training, Strengthening, Endurance training, Range of motion, Therapeutic exercise, Therapeutic activity, Home exercise program  PT Plan: Ongoing PT  PT Frequency: 5 times per week  PT Discharge Recommendations: Low intensity level of continued care  PT Recommended Transfer Status: Assist x1  PT - OK to Discharge: Yes      General Visit Information:   PT  Visit  PT Received On: 10/14/24  General  Reason for Referral: Impaired Mobility  Referred By: Dr. Sams  Past Medical History Relevant to Rehab: Thyroid Surg  Prior to Session Communication: Bedside nurse  Patient Position Received: Bed, 3 rail up, Alarm off, not on at start of session  Preferred Learning Style: verbal, visual  General Comment: Cleared by nurse to see. Patient agreeable to therapy    Subjective   Precautions:  Precautions  UE Weight Bearing Status: Right Non-Weight Bearing (No sling)  Medical Precautions: Fall precautions  Precautions Comment: Not wearing slinf Not using R UE at all    Vital Signs (Past 2hrs)        Date/Time Vitals Session Patient Position Pulse Resp SpO2 BP MAP (mmHg)    10/14/24 0916 --  --  76  --  --  139/89  84                         Objective   Pain:  Pain Assessment  Pain Assessment: 0-10  0-10 (Numeric)  Pain Score: 6  Pain Type: Acute pain  Pain Location: Shoulder  Pain Orientation: Right  Pain Descriptors: Sharp, Stabbing  Pain Frequency: Constant/continuous  Pain Onset: Ongoing  Cognition:     Coordination:     Postural Control:     Extremity/Trunk Assessments:    Activity Tolerance:     Treatments:  Therapeutic Exercise  Therapeutic Exercise Performed: Yes  Therapeutic Exercise Activity 1: B LE supine ther ex: ankle pumps,quad/gluteal sets, heelslides, SAQs, hip abduction/adduction x 20    Bed Mobility  Bed Mobility: Yes  Bed Mobility 1  Bed Mobility 1: Supine to sitting  Level of Assistance 1: Close supervision  Bed Mobility Comments 1: HOB elevated with use of bed rail with L UE  Bed Mobility 2  Bed Mobility  2: Sitting to supine  Level of Assistance 2: Close supervision  Bed Mobility Comments 2: Extra time to get back into bed due to not using R UE    Ambulation/Gait Training  Ambulation/Gait Training Performed: Yes  Ambulation/Gait Training 1  Surface 1: Level tile  Device 1: No device  Assistance 1: Contact guard  Quality of Gait 1: Decreased step length  Comments/Distance (ft) 1: Gait 110' x 2 with no AD with contact guard assist. No LOB No SOB Seated rest between walks  Transfers  Transfer: Yes  Transfer 1  Transfer From 1: Bed to  Transfer to 1: Stand  Technique 1: Sit to stand  Transfer Device 1:  (No AD)  Transfer Level of Assistance 1: Close supervision  Trials/Comments 1: x 1 trial STS with cues for L UEhand placement with close supervision  Transfers 2  Transfer From 2: Stand to  Transfer to 2: Sit, Chair with arms  Technique 2: Stand to sit  Transfer Device 2:  (No AD)  Transfer Level of Assistance 2: Close supervision  Trials/Comments 2: x 2 trials STS with cues for safe hand placement with L UE  Transfers 3  Transfer From 3: Sit to  Transfer to 3: Stand  Technique 3: Sit to stand  Transfer Device 3:  (No AD)  Transfer Level of Assistance 3: Close supervision  Trials/Comments 3: x 2 trials STS  with close supervision cues for safe L hand placement    Outcome Measures:  [facility] Basic Mobility  Turning from your back to your side while in a flat bed without using bedrails: A little  Moving from lying on your back to sitting on the side of a flat bed without using bedrails: A little  Moving to and from bed to chair (including a wheelchair): A little  Standing up from a chair using your arms (e.g. wheelchair or bedside chair): A little  To walk in hospital room: A little  Climbing 3-5 steps with railing: Total  Basic Mobility - Total Score: 16    Education Documentation  Handouts, taught by Tonya Castro PTA at 10/14/2024 10:35 AM.  Learner: Patient  Readiness: Acceptance  Method: Explanation  Response: Verbalizes Understanding, Needs Reinforcement    Home Exercise Program, taught by Tonya Castro PTA at 10/14/2024 10:35 AM.  Learner: Patient  Readiness: Acceptance  Method: Explanation  Response: Verbalizes Understanding, Needs Reinforcement    Mobility Training, taught by Tonya Castro PTA at 10/14/2024 10:35 AM.  Learner: Patient  Readiness: Acceptance  Method: Explanation  Response: Verbalizes Understanding, Needs Reinforcement    Education Comments  No comments found.        OP EDUCATION:       Encounter Problems       Encounter Problems (Active)       Balance       Standing Balance (Progressing)       Start:  10/11/24    Expected End:  10/25/24       Pt will demonstrate good static standing balance to promote safe participation with out of bed activity, transfers, and mobility              Mobility       Ambulation STG (Progressing)       Start:  10/11/24    Expected End:  10/25/24       Pt will ambulate 50' modified independent assist with LRD to promote safe home mobility           Ambulation LTG (Progressing)       Start:  10/11/24    Expected End:  10/25/24       Pt will ambulate 300' independent assist with no device to promote safe home mobility              PT Transfers       Supine to  sit (Progressing)       Start:  10/11/24    Expected End:  10/25/24       Pt will transfer supine to sitting at edge of bed with modified independent assist to promote acute care out of bed activity           Sit to stand (Progressing)       Start:  10/11/24    Expected End:  10/25/24       Pt will transfer sit to standing position with modified independent assist and LRD to promote safe out of bed activity              Safety       Safe Mobility Techniques (Progressing)       Start:  10/11/24    Expected End:  10/25/24       Pt will correctly identify and demonstrate safe mobility techniques to reduce their risks for falls during their acute care stay

## 2024-10-14 NOTE — CONSULTS
"Nutrition Assessement Note    Nutrition Assessment    Reason for Assessment: Admission nursing screening    Reason for Hospital Admission:  Elli Mejia is a 44 y.o. female who is admitted for tachycardia. Stress test today. Plan for heart cath. Anemia - JUAN per GI.     Nutrition History:  Food and Nutrient History: pt reports having a good appetite at this time. does not eat much meat but eats some. does not follow any particular diet at this time. agreeable to JUAN nutrition therapy information.    Anthropometrics:  Ht: 167.6 cm (5' 6\"), Wt: 142 kg (313 lb 8 oz), BMI: 50.62  IBW/kg (Dietitian Calculated): 59.09 kg  Percent of IBW: 241 %  Adjusted Body Weight (kg): 79.89 kg    Weight Change:  Daily Weight  10/13/24 : 142 kg (313 lb 8 oz)  10/02/24 : 136 kg (299 lb 2.6 oz)  09/01/23 : 130 kg (287 lb)  06/06/23 : 130 kg (287 lb)  05/05/23 : 130 kg (287 lb)  09/12/22 : 130 kg (287 lb 0.6 oz)  07/25/22 : 131 kg (288 lb)  05/31/22 : 128 kg (282 lb)  05/24/22 : 129 kg (284 lb)     Weight History / % Weight Change: denies any significant wt changes. states her weight changes often d/t thyroid issues.  Significant Weight Loss: No    Nutrition Focused Physical Exam Findings:   Subcutaneous Fat Loss  Orbital Fat Pads: Well nourished (slightly bulging fat pads)  Buccal Fat Pads: Well nourished (full, rounded cheeks)    Muscle Wasting  Temporalis: Well nourished (well-defined muscle)  Pectoralis (Clavicular Region): Well nourished (clavicle not visible)  Deltoid/Trapezius: Well nourished (rounded appearance at arm, shoulder, neck)    Nutrition Significant Labs:  Lab Results   Component Value Date    WBC 14.6 (H) 10/14/2024    HGB 8.3 (L) 10/14/2024    HCT 26.4 (L) 10/14/2024     10/14/2024    CHOL 263 (H) 05/05/2023    TRIG 312 (H) 05/05/2023    HDL 41.3 05/05/2023    ALT 18 10/12/2024    AST 22 10/12/2024     10/14/2024    K 4.5 10/14/2024     10/14/2024    CREATININE 1.05 10/14/2024    BUN 16 " 10/14/2024    CO2 22 10/14/2024    TSH 40.27 (H) 10/10/2024    HGBA1C 5.8 09/13/2022     Nutrition Specific Medications:  atorvastatin, 40 mg, oral, Nightly  chlorhexidine, 15 mL, Mouth/Throat, BID  docusate sodium, 100 mg, oral, BID  levothyroxine, 200 mcg, oral, Daily  melatonin, 3 mg, oral, Nightly  methocarbamol, 750 mg, oral, BID  metoprolol succinate XL, 100 mg, oral, Daily  pantoprazole, 40 mg, oral, Daily before breakfast   Or  pantoprazole, 40 mg, intravenous, Daily before breakfast  piperacillin-tazobactam, 4.5 g, intravenous, q8h  polyethylene glycol, 17 g, oral, Daily  valACYclovir, 1,000 mg, oral, TID      Dietary Orders (From admission, onward)       Start     Ordered    10/15/24 0001  NPO Diet Except: Sips with meds; Effective midnight  Diet effective midnight        Question:  Except:  Answer:  Sips with meds    10/14/24 1329    10/10/24 2026  Adult diet Regular  Diet effective now        Question:  Diet type  Answer:  Regular    10/10/24 2028                  Estimated Needs:   Estimated Energy Needs  Total Energy Estimated Needs (kCal): 1977 kCal  Total Estimated Energy Need per Day (kCal/kg): 25 kCal/kg  Method for Estimating Needs: ABW    Estimated Protein Needs  Total Protein Estimated Needs (g): 80 g  Total Protein Estimated Needs (g/kg): 1 g/kg  Method for Estimating Needs: ABW    Estimated Fluid Needs  Method for Estimating Needs: 1 ml/kcal or per MD        Nutrition Diagnosis   Nutrition Diagnosis:  Malnutrition Diagnosis  Patient has Malnutrition Diagnosis: No    Nutrition Diagnosis  Patient has Nutrition Diagnosis: Yes  Diagnosis Status (1): New  Nutrition Diagnosis 1: Food and nutrition related knowledge deficit  Related to (1): lack of prior exposure to accurate nutrition related information  As Evidenced by (1): request for education       Nutrition Interventions/Recommendations   Nutrition Interventions and Recommendations:    Nutrition Prescription:  Individualized Nutrition  Prescription Provided for : 1977 kcals and 80g protein to be provided via diet    Nutrition Interventions:   Food and/or Nutrient Delivery Interventions  Interventions: Meals and snacks  Meals and Snacks: Mineral-modified diet  Goal: may benefit from a low Na+ diet  Additional Interventions: provided and reviewed iron deficiency anemia handout    Education Documentation  Nutrition Care Manual, taught by Isabel Lock RD, LD at 10/14/2024 12:10 PM.  Learner: Patient  Readiness: Acceptance  Method: Explanation, Handout  Response: Verbalizes Understanding  Comment: provided and reviewed iron deficiency anemia handout             Nutrition Monitoring and Evaluation   Monitoring/Evaluation:   Food/Nutrient Related History Monitoring  Monitoring and Evaluation Plan: Energy intake  Energy Intake: Estimated energy intake  Criteria: pt to consume >/= 75% estimated needs  Additional Plans: pt will plan meals within prescribed guidelines       Time Spent/Follow-up:   Follow Up  Time Spent (min): 30 minutes  Last Date of Nutrition Visit: 10/14/24  Nutrition Follow-Up Needed?: 7-10 days  Follow up Comment: 10/24/24

## 2024-10-14 NOTE — DOCUMENTATION CLARIFICATION NOTE
"    PATIENT:               BRIEN PANDYA  ACCT #:                  [id number]  MRN:                       [id number]  :                       1980  ADMIT DATE:       10/10/2024 4:10 PM  DISCH DATE:  RESPONDING PROVIDER #:        [id number]          PROVIDER RESPONSE TEXT:    Acute on chronic blood loss anemia    CDI QUERY TEXT:    Clarification      Instruction:    Based on your assessment of the patient and the clinical information, please provide the requested documentation by clicking on the appropriate radio button and enter any additional information if prompted.    Question: Please further clarify the diagnosis of anemia as    When answering this query, please exercise your independent professional judgment. The fact that a question is being asked, does not imply that any particular answer is desired or expected.    The patient's clinical indicators include:  Clinical Information:  44 year old female presented with a-flutter/palpitations.    Clinical Indicators:  Labs- H/H-  10/10/24- 9.8/30.8         10/11/24- 8.1/25.9         10/12/24- 7.4/23.7        10/13/24- 7.7/25.1  10/11/24- H/P- \"She is also having very heavy periods for past 1 year.  She is also found to be  anemic\".  10/11/24- Consult Cardiology- \"  Anemia.  Patient has severe anemia.  Reports heavy menses\".  10/13/24- GI Consult- \"Normocytic anemia with ferritin 22. Likely JUAN. She reports heavy menstration monthly. This seems more likely source than GI etiology\".    Treatment:  Daily H/H  10/13/24- Venofer 200mg IV x 1    Risk Factors:  Heavy Menses  Options provided:  -- Acute blood loss anemia  -- Chronic blood loss anemia  -- Acute on chronic blood loss anemia  -- Other - I will add my own diagnosis  -- Refer to Clinical Documentation Reviewer    Query created by: Tigist Adames on 10/13/2024 2:25 PM      Electronically signed by:  AMPARO GHOSH MD 10/14/2024 1:05 PM          "

## 2024-10-14 NOTE — CARE PLAN
Problem: Pain  Goal: Takes deep breaths with improved pain control throughout the shift  Outcome: Progressing  Goal: Turns in bed with improved pain control throughout the shift  Outcome: Progressing  Goal: Walks with improved pain control throughout the shift  Outcome: Progressing  Goal: Performs ADL's with improved pain control throughout shift  Outcome: Progressing  Goal: Participates in PT with improved pain control throughout the shift  Outcome: Progressing  Goal: Free from opioid side effects throughout the shift  Outcome: Progressing  Goal: Free from acute confusion related to pain meds throughout the shift  Outcome: Progressing     The patient's goals for the shift include to get some rest    The clinical goals for the shift include monitor patient pain, keep pt as comfortable as possible

## 2024-10-14 NOTE — PROGRESS NOTES
10/14/24 0850   Discharge Planning   Home or Post Acute Services None   Expected Discharge Disposition Home   Does the patient need discharge transport arranged? No

## 2024-10-14 NOTE — PROGRESS NOTES
Occupational Therapy                 Therapy Communication Note    Patient Name: Elli Mejia  MRN: [id number]  Department: [address]  Room: [address] [place], [place] [address]  Today's Date: 10/14/2024     Discipline: Occupational Therapy    Missed Visit Reason: Missed Visit Reason: Patient refused (Pt requesting to defer rx session for today as she is visiting w/ family.)    Missed Time: Attempt    Comment:

## 2024-10-14 NOTE — PROGRESS NOTES
Subjective Data:  Patient is feeling okay.  Chest pain improving with Tylenol and tramadol.    Overnight Events:    Telemetry overnight reviewed no events     Objective Data:  Last Recorded Vitals:  Vitals:    10/13/24 1145 10/13/24 1529 10/13/24 2100 10/14/24 0714   BP: 97/58 111/75 121/81 134/83   BP Location: Left arm Left arm Left arm Left arm   Patient Position: Lying Lying Lying Lying   Pulse: 83 77 78 78   Resp: 13 14 20 16   Temp: 36.4 °C (97.6 °F) 35.8 °C (96.4 °F) 36 °C (96.8 °F) 35.8 °C (96.4 °F)   TempSrc: Temporal Temporal Temporal Temporal   SpO2: 99% 99%  100%   Weight:       Height:           Last Labs:  CBC - 10/14/2024:  5:54 AM  14.6 8.3 403    26.4      CMP - 10/14/2024:  5:54 AM  9.2 6.4 22 --- 0.3   _ 3.8 18 50      PTT - No results in last year.  _   _ _     TROPHS   Date/Time Value Ref Range Status   10/11/2024 04:28 AM 30 0 - 13 ng/L Final   10/10/2024 05:18 PM 44 0 - 13 ng/L Final   10/10/2024 04:19 PM 13 0 - 13 ng/L Final     HGBA1C   Date/Time Value Ref Range Status   09/13/2022 04:00 AM 5.8 4.0 - 6.0 % Final     Comment:     Hemoglobin A1C levels are related to mean blood glucose during the   preceding 2-3 months. The relationship table below may be used as a   general guide. Each 1% increase in HGB A1C is a reflection of an   increase in mean glucose of approximately 30 mg/dl.   Reference: Diabetes Care, volume 29, supplement 1 Jan. 2006                        HGB A1C ................. Approx. Mean Glucose   _______________________________________________   6%   ...............................  120 mg/dl   7%   ...............................  150 mg/dl   8%   ...............................  180 mg/dl   9%   ...............................  210 mg/dl   10%  ...............................  240 mg/dl  Performed at [address] [address]       [facility]   Date/Time Value Ref Range Status   05/05/2023 02:30 PM 62 0 - 40 mg/dL Final      Last I/O:  I/O last 3 completed  shifts:  In: 410 (2.9 mL/kg) [IV Piggyback:410]  Out: - (0 mL/kg)   Weight: 142.2 kg     Past Cardiology Tests (Last 3 Years):  EKG:  ECG 12 lead 10/10/2024 (Preliminary)      ECG 12 lead 10/10/2024 (Preliminary)    Echo:  Transthoracic Echo (TTE) Complete 10/11/2024    Ejection Fractions:  EF   Date/Time Value Ref Range Status   10/11/2024 01:38 PM 63 %      Cath:  No results found for this or any previous visit from the past 1095 days.    Stress Test:  No results found for this or any previous visit from the past 1095 days.    Cardiac Imaging:  No results found for this or any previous visit from the past 1095 days.      Inpatient Medications:  Scheduled medications   Medication Dose Route Frequency    atorvastatin  40 mg oral Nightly    chlorhexidine  15 mL Mouth/Throat BID    docusate sodium  100 mg oral BID    levothyroxine  200 mcg oral Daily    melatonin  3 mg oral Nightly    methocarbamol  750 mg oral BID    metoprolol succinate XL  100 mg oral Daily    pantoprazole  40 mg oral Daily before breakfast    Or    pantoprazole  40 mg intravenous Daily before breakfast    piperacillin-tazobactam  4.5 g intravenous q8h    polyethylene glycol  17 g oral Daily    valACYclovir  1,000 mg oral TID     PRN medications   Medication    acetaminophen    Or    acetaminophen    Or    acetaminophen    benzocaine-menthol    cyclobenzaprine    dextromethorphan-guaifenesin    guaiFENesin    hydrocortisone    ondansetron ODT    Or    ondansetron    traMADol     Continuous Medications   Medication Dose Last Rate       Physical Exam:  General: Patient is in no acute distress.  HEENT: atraumatic normocephalic.  Neck: is supple jugular venous pressure within normal limits no thyromegaly.  Cardiovascular regular rate and rhythm normal heart sounds no murmurs rubs or gallops.  Lungs: clear to auscultation bilaterally.  Abdomen: is soft nontender.  Extremities warm to touch no edema.    Assessment/Plan   1 tachycardia.  All EKGs that I  reviewed showing sinus tachycardia.  There is no clear evidence of atrial flutter.  Patient received cardioversion yesterday for flutter according to the ER physician.  Was given Lovenox one-time.  Her MVQ2CV1-TANi score is 1.  Blood pressure and heart rate well-controlled with metoprolol.    2.  Chest pain.  Atypical chest pain associated with elevated white count.  No cough fever or chills.  Troponin slightly elevated after the cardioversion 43.  Repeated troponin trending down.  Await result of the Lexiscan nuclear stress test for final recommendation however her chest pain appears to be musculoskeletal.  It is improving with tramadol Tylenol and its reproducible to palpation.     3.  Hypertension.  Blood pressure well-controlled continue metoprolol 100 mg oral daily.     4.  Anemia.  Management by primary team     5.  Elevated white count.  Managed by primary team    6.  Hypothyroidism.  Patient on levothyroxine.  Likely noncompliance with medications.  Management by primary team    Peripheral IV 10/13/24 20 G Distal;Right;Upper;Anterior Arm (Active)   Site Assessment Clean;Dry;Intact 10/13/24 2100   Dressing Status Clean;Dry 10/13/24 2100   Number of days: 1       Code Status:  Full Code      Yovani Pandey MD

## 2024-10-15 ENCOUNTER — HOSPITAL ENCOUNTER (OUTPATIENT)
Facility: HOSPITAL | Age: 44
Setting detail: OUTPATIENT SURGERY
Discharge: ANOTHER HOSPITAL | End: 2024-10-15
Attending: INTERNAL MEDICINE | Admitting: INTERNAL MEDICINE
Payer: COMMERCIAL

## 2024-10-15 VITALS
HEART RATE: 66 BPM | RESPIRATION RATE: 18 BRPM | DIASTOLIC BLOOD PRESSURE: 96 MMHG | OXYGEN SATURATION: 95 % | SYSTOLIC BLOOD PRESSURE: 164 MMHG

## 2024-10-15 DIAGNOSIS — R07.9 ACUTE CHEST PAIN: ICD-10-CM

## 2024-10-15 LAB
ANION GAP SERPL CALCULATED.3IONS-SCNC: 14 MMOL/L (ref 10–20)
BACTERIA BLD CULT: NORMAL
BACTERIA BLD CULT: NORMAL
BUN SERPL-MCNC: 17 MG/DL (ref 6–23)
CALCIUM SERPL-MCNC: 9.4 MG/DL (ref 8.6–10.3)
CHLORIDE SERPL-SCNC: 102 MMOL/L (ref 98–107)
CO2 SERPL-SCNC: 24 MMOL/L (ref 21–32)
CREAT SERPL-MCNC: 1.06 MG/DL (ref 0.5–1.05)
EGFRCR SERPLBLD CKD-EPI 2021: 67 ML/MIN/1.73M*2
ERYTHROCYTE [DISTWIDTH] IN BLOOD BY AUTOMATED COUNT: 16.5 % (ref 11.5–14.5)
GLUCOSE SERPL-MCNC: 109 MG/DL (ref 74–99)
HCT VFR BLD AUTO: 26 % (ref 36–46)
HGB BLD-MCNC: 8 G/DL (ref 12–16)
MCH RBC QN AUTO: 29.2 PG (ref 26–34)
MCHC RBC AUTO-ENTMCNC: 30.8 G/DL (ref 32–36)
MCV RBC AUTO: 95 FL (ref 80–100)
NRBC BLD-RTO: 0.5 /100 WBCS (ref 0–0)
PLATELET # BLD AUTO: 416 X10*3/UL (ref 150–450)
POTASSIUM SERPL-SCNC: 4.1 MMOL/L (ref 3.5–5.3)
PREGNANCY TEST URINE, POC: NEGATIVE
RBC # BLD AUTO: 2.74 X10*6/UL (ref 4–5.2)
SODIUM SERPL-SCNC: 136 MMOL/L (ref 136–145)
WBC # BLD AUTO: 14.4 X10*3/UL (ref 4.4–11.3)

## 2024-10-15 PROCEDURE — 99153 MOD SED SAME PHYS/QHP EA: CPT | Performed by: INTERNAL MEDICINE

## 2024-10-15 PROCEDURE — 2720000007 HC OR 272 NO HCPCS: Performed by: INTERNAL MEDICINE

## 2024-10-15 PROCEDURE — 80048 BASIC METABOLIC PNL TOTAL CA: CPT | Performed by: INTERNAL MEDICINE

## 2024-10-15 PROCEDURE — C1760 CLOSURE DEV, VASC: HCPCS | Performed by: INTERNAL MEDICINE

## 2024-10-15 PROCEDURE — 99152 MOD SED SAME PHYS/QHP 5/>YRS: CPT | Performed by: INTERNAL MEDICINE

## 2024-10-15 PROCEDURE — C1894 INTRO/SHEATH, NON-LASER: HCPCS | Performed by: INTERNAL MEDICINE

## 2024-10-15 PROCEDURE — 2500000001 HC RX 250 WO HCPCS SELF ADMINISTERED DRUGS (ALT 637 FOR MEDICARE OP): Performed by: NURSE PRACTITIONER

## 2024-10-15 PROCEDURE — 2500000004 HC RX 250 GENERAL PHARMACY W/ HCPCS (ALT 636 FOR OP/ED): Performed by: INTERNAL MEDICINE

## 2024-10-15 PROCEDURE — 2060000001 HC INTERMEDIATE ICU ROOM DAILY

## 2024-10-15 PROCEDURE — 7100000009 HC PHASE TWO TIME - INITIAL BASE CHARGE: Performed by: INTERNAL MEDICINE

## 2024-10-15 PROCEDURE — 2780000003 HC OR 278 NO HCPCS: Performed by: INTERNAL MEDICINE

## 2024-10-15 PROCEDURE — 7100000010 HC PHASE TWO TIME - EACH INCREMENTAL 1 MINUTE: Performed by: INTERNAL MEDICINE

## 2024-10-15 PROCEDURE — 2500000001 HC RX 250 WO HCPCS SELF ADMINISTERED DRUGS (ALT 637 FOR MEDICARE OP): Performed by: INTERNAL MEDICINE

## 2024-10-15 PROCEDURE — 99231 SBSQ HOSP IP/OBS SF/LOW 25: CPT | Performed by: INTERNAL MEDICINE

## 2024-10-15 PROCEDURE — 85027 COMPLETE CBC AUTOMATED: CPT | Performed by: INTERNAL MEDICINE

## 2024-10-15 PROCEDURE — 2550000001 HC RX 255 CONTRASTS: Performed by: INTERNAL MEDICINE

## 2024-10-15 PROCEDURE — 36415 COLL VENOUS BLD VENIPUNCTURE: CPT | Performed by: INTERNAL MEDICINE

## 2024-10-15 PROCEDURE — G0269 OCCLUSIVE DEVICE IN VEIN ART: HCPCS | Mod: 59 | Performed by: INTERNAL MEDICINE

## 2024-10-15 PROCEDURE — 99233 SBSQ HOSP IP/OBS HIGH 50: CPT | Performed by: INTERNAL MEDICINE

## 2024-10-15 PROCEDURE — 93458 L HRT ARTERY/VENTRICLE ANGIO: CPT | Performed by: INTERNAL MEDICINE

## 2024-10-15 PROCEDURE — 2500000005 HC RX 250 GENERAL PHARMACY W/O HCPCS: Performed by: INTERNAL MEDICINE

## 2024-10-15 RX ORDER — ENOXAPARIN SODIUM 100 MG/ML
40 INJECTION SUBCUTANEOUS EVERY 12 HOURS SCHEDULED
Status: DISCONTINUED | OUTPATIENT
Start: 2024-10-15 | End: 2024-10-15 | Stop reason: HOSPADM

## 2024-10-15 RX ORDER — IODIXANOL 320 MG/ML
INJECTION, SOLUTION INTRAVASCULAR AS NEEDED
Status: DISCONTINUED | OUTPATIENT
Start: 2024-10-15 | End: 2024-10-15 | Stop reason: HOSPADM

## 2024-10-15 RX ORDER — MIDAZOLAM HYDROCHLORIDE 1 MG/ML
INJECTION, SOLUTION INTRAMUSCULAR; INTRAVENOUS AS NEEDED
Status: DISCONTINUED | OUTPATIENT
Start: 2024-10-15 | End: 2024-10-15 | Stop reason: HOSPADM

## 2024-10-15 RX ORDER — LIDOCAINE HYDROCHLORIDE 10 MG/ML
INJECTION, SOLUTION EPIDURAL; INFILTRATION; INTRACAUDAL; PERINEURAL AS NEEDED
Status: DISCONTINUED | OUTPATIENT
Start: 2024-10-15 | End: 2024-10-15 | Stop reason: HOSPADM

## 2024-10-15 RX ORDER — FENTANYL CITRATE 50 UG/ML
INJECTION, SOLUTION INTRAMUSCULAR; INTRAVENOUS AS NEEDED
Status: DISCONTINUED | OUTPATIENT
Start: 2024-10-15 | End: 2024-10-15 | Stop reason: HOSPADM

## 2024-10-15 RX ORDER — ACETAMINOPHEN 325 MG/1
650 TABLET ORAL ONCE
Status: COMPLETED | OUTPATIENT
Start: 2024-10-15 | End: 2024-10-15

## 2024-10-15 ASSESSMENT — COGNITIVE AND FUNCTIONAL STATUS - GENERAL
MOBILITY SCORE: 24
MOBILITY SCORE: 24
DAILY ACTIVITIY SCORE: 24
DAILY ACTIVITIY SCORE: 24

## 2024-10-15 ASSESSMENT — PAIN DESCRIPTION - LOCATION
LOCATION: SHOULDER

## 2024-10-15 ASSESSMENT — PAIN - FUNCTIONAL ASSESSMENT
PAIN_FUNCTIONAL_ASSESSMENT: 0-10

## 2024-10-15 ASSESSMENT — PAIN SCALES - GENERAL
PAINLEVEL_OUTOF10: 2
PAINLEVEL_OUTOF10: 3
PAINLEVEL_OUTOF10: 3
PAINLEVEL_OUTOF10: 0 - NO PAIN
PAINLEVEL_OUTOF10: 7
PAINLEVEL_OUTOF10: 10 - WORST POSSIBLE PAIN
PAINLEVEL_OUTOF10: 0 - NO PAIN
PAINLEVEL_OUTOF10: 4
PAINLEVEL_OUTOF10: 0 - NO PAIN

## 2024-10-15 ASSESSMENT — PAIN DESCRIPTION - ORIENTATION
ORIENTATION: RIGHT
ORIENTATION: RIGHT

## 2024-10-15 ASSESSMENT — PAIN SCALES - PAIN ASSESSMENT IN ADVANCED DEMENTIA (PAINAD): TOTALSCORE: MEDICATION (SEE MAR)

## 2024-10-15 NOTE — CARE PLAN
The patient's goals for the shift include rest    The clinical goals for the shift include monitor hemodynamic stability    Over the shift, the patient did make some progress toward the following goals. Barriers to progression include pain to right shoulder. Recommendations to address these barriers include continuing to monitor pain levels and using non-pharmacologic methods.

## 2024-10-15 NOTE — PROGRESS NOTES
10/15/24 1230   Discharge Planning   Home or Post Acute Services None   Expected Discharge Disposition Home   Does the patient need discharge transport arranged? No     Patient off floor at [facility] for Heart Cath.  Anticipate home with no needs at discharge.

## 2024-10-15 NOTE — PROGRESS NOTES
"Elli Mejia is a 44 y.o. female on day 4 of admission presenting with Atrial flutter with rapid ventricular response (Multi).    Subjective   Patient is complaining of right shoulder pain.  She had a fall a week ago.  Patient had stress test done abnormal plan for cardiac cath     Objective     Physical Exam  Vitals reviewed.   Constitutional:       Appearance: Normal appearance.   HENT:      Head: Normocephalic and atraumatic.      Right Ear: Tympanic membrane, ear canal and external ear normal.      Left Ear: Tympanic membrane, ear canal and external ear normal.      Nose: Nose normal.      Mouth/Throat:      Pharynx: Oropharynx is clear.   Eyes:      Extraocular Movements: Extraocular movements intact.      Conjunctiva/sclera: Conjunctivae normal.      Pupils: Pupils are equal, round, and reactive to light.   Cardiovascular:      Rate and Rhythm: Normal rate and regular rhythm.      Pulses: Normal pulses.      Heart sounds: Normal heart sounds.   Pulmonary:      Effort: Pulmonary effort is normal.      Breath sounds: Normal breath sounds.   Abdominal:      General: Abdomen is flat. Bowel sounds are normal.      Palpations: Abdomen is soft.   Musculoskeletal:         General: Tenderness present.      Cervical back: Normal range of motion and neck supple.      Comments: Tenderness right shoulder   Skin:     General: Skin is warm and dry.   Neurological:      General: No focal deficit present.      Mental Status: She is alert and oriented to person, place, and time.   Psychiatric:         Mood and Affect: Mood normal.         Last Recorded Vitals  Blood pressure 120/85, pulse 88, temperature 36.4 °C (97.5 °F), temperature source Temporal, resp. rate 14, height 1.676 m (5' 6\"), weight 142 kg (313 lb 8 oz), last menstrual period 09/29/2024, SpO2 98%.  Intake/Output last 3 Shifts:  I/O last 3 completed shifts:  In: 210 (1.5 mL/kg) [IV Piggyback:210]  Out: - (0 mL/kg)   Weight: 142.2 kg     Relevant Results       "         Scheduled medications  atorvastatin, 40 mg, oral, Nightly  chlorhexidine, 15 mL, Mouth/Throat, BID  docusate sodium, 100 mg, oral, BID  levothyroxine, 200 mcg, oral, Daily  melatonin, 3 mg, oral, Nightly  methocarbamol, 750 mg, oral, BID  metoprolol succinate XL, 100 mg, oral, Daily  pantoprazole, 40 mg, oral, Daily before breakfast   Or  pantoprazole, 40 mg, intravenous, Daily before breakfast  piperacillin-tazobactam, 4.5 g, intravenous, q8h  polyethylene glycol, 17 g, oral, Daily  valACYclovir, 1,000 mg, oral, TID      Continuous medications     PRN medications  PRN medications: acetaminophen **OR** acetaminophen **OR** acetaminophen, benzocaine-menthol, cyclobenzaprine, dextromethorphan-guaifenesin, guaiFENesin, hydrocortisone, ondansetron ODT **OR** ondansetron, traMADol  Results for orders placed or performed during the hospital encounter of 10/10/24 (from the past 24 hour(s))   CBC   Result Value Ref Range    WBC 14.6 (H) 4.4 - 11.3 x10*3/uL    nRBC 0.8 (H) 0.0 - 0.0 /100 WBCs    RBC 2.81 (L) 4.00 - 5.20 x10*6/uL    Hemoglobin 8.3 (L) 12.0 - 16.0 g/dL    Hematocrit 26.4 (L) 36.0 - 46.0 %    MCV 94 80 - 100 fL    MCH 29.5 26.0 - 34.0 pg    MCHC 31.4 (L) 32.0 - 36.0 g/dL    RDW 16.1 (H) 11.5 - 14.5 %    Platelets 403 150 - 450 x10*3/uL   Basic Metabolic Panel   Result Value Ref Range    Glucose 130 (H) 74 - 99 mg/dL    Sodium 137 136 - 145 mmol/L    Potassium 4.5 3.5 - 5.3 mmol/L    Chloride 102 98 - 107 mmol/L    Bicarbonate 22 21 - 32 mmol/L    Anion Gap 18 10 - 20 mmol/L    Urea Nitrogen 16 6 - 23 mg/dL    Creatinine 1.05 0.50 - 1.05 mg/dL    eGFR 68 >60 mL/min/1.73m*2    Calcium 9.2 8.6 - 10.3 mg/dL     Nuclear Stress Test    Result Date: 10/14/2024  Interpreted By:  Jeremy Reddy and Maltbie Grace STUDY: NUCLEAR STRESS TEST;  10/14/2024 8:46 am   INDICATION: Signs/Symptoms:chest pain. ,R07.2 Precordial pain   COMPARISON: None.   ACCESSION NUMBER(S): [id number]   ORDERING CLINICIAN: RONEL TAPIA    TECHNIQUE: DIVISION OF NUCLEAR MEDICINE PHARMACOLOGIC STRESS MYOCARDIAL PERFUSION SCAN, TWO DAY PROTOCOL   On 10/11/2024 the patient then an intravenous infusion of 0.4mg regadenoson (Lexiscan)  followed by 32.0 mCi of Tc-99m Myoview. Stress phase SPECT images of the myocardium were then acquired. These included ECG-gated images to assess and quantify ventricular function. The patient then returned on 10/14/2024 and received an intravenous dose of  32.0 mCi of Tc-99m  Myoview and resting emission tomographic (SPECT) images of the myocardium were acquired.   FINDINGS: Note is made of mild reversible perfusion defect along the anterior wall on stress imaging.   The left ventricle is normal in size.   Gated images demonstrate normal LV wall motion with a post-stress LV EF estimated at greater than 65%.       1. Note is made of mild reversible perfusion defect along the anterior wall on stress imaging, which may correspond to asymmetric breast attenuation artifact versus mild ischemia, clinical correlation is recommended. 2. The left ventricle is normal in size. 3. Normal LV wall motion with a post-stress LV EF estimated at greater than 65%.     I personally reviewed the images/study and I agree with the findings as stated by Belen Willingham MD. This study was interpreted at [place], [place].   MACRO: None   Signed by: Jeremy Reddy 10/14/2024 9:19 AM Dictation workstation:   [id number]    MR shoulder right wo IV contrast    Result Date: 10/14/2024  Interpreted By:  Lance Boles, STUDY: MR SHOULDER RIGHT WO IV CONTRAST; ;  10/13/2024 1:01 pm   INDICATION: Signs/Symptoms:Right shoulder injury. Pain     COMPARISON: None.   ACCESSION NUMBER(S): [id number]   ORDERING CLINICIAN: AMPARO GHOSH   TECHNIQUE: Multiplanar multisequence MRI obtained of the right shoulder.   FINDINGS: Supraspinatus tendon demonstrates mild tendinosis. There is no focal full-thickness rotator cuff  tear. Supraspinatus muscle is unremarkable.   Infraspinatus tendon demonstrates articular surface fraying of the anterior tendon in the para footprint location. No full-thickness perforation. Infraspinatus muscle is unremarkable   Subscapularis tendon is unremarkable. Subscapularis muscle is unremarkable   Long head of the biceps tendon is unremarkable within the bicipital groove. Intra-articular portion of the tendon proximally demonstrates mild tendinosis   Glenohumeral articulation demonstrates no effusion. Glenoid labrum demonstrates mild irregular morphology of the superior labrum about the biceps labrum anchor with subtle component of tear suggested. No paralabral cyst formation. Axillary pouch is unremarkable. Quadrilateral space is unremarkable   No subacromial subdeltoid fluid. Acromioclavicular joint is unremarkable. There is mild pericapsular edema about the articulation. A type 1 acromion is seen.               1. No focal full-thickness rotator cuff tear.   2. Articular surface fraying of the anterior infraspinatus tendon.   3. No glenohumeral joint effusion. Glenoid labrum demonstrates irregular morphology of the superior labrum about the biceps anchor with subtle component of tear. No detachment demonstrated.     MACRO: None   Signed by: Lance Boles 10/14/2024 8:06 AM Dictation workstation:   [id number]                  Assessment/Plan   Assessment & Plan  Atrial flutter with rapid ventricular response (Multi)    Anemia    Hyperlipidemia    Hypothyroidism    Menorrhagia with regular cycle    Depression with anxiety    Obesity, Class III, BMI 40-49.9 (morbid obesity) (Multi)    H/H stable  X-ray shoulder negative  MRI of the shoulder unremarkable  Continue physical therapy  Stress test positive  Plan for cardiac cath  Echo normal  Patient restarted on thyroid medication she was noncompliant will adjust dose after 6 weeks         I spent  minutes in the professional and overall care of this  patient.      Nilam Sams MD

## 2024-10-15 NOTE — PROGRESS NOTES
Physical Therapy                 Therapy Communication Note    Patient Name: Elli Mejia  MRN: [id number]  Department: [facility]  Room: [address] [place], [place] [address]  Today's Date: 10/15/2024     Discipline: Physical Therapy    Missed Visit Reason: Missed Visit Reason: Patient in a medical procedure (At [place] for test)    Missed Time: Attempt    Comment:

## 2024-10-15 NOTE — Clinical Note
Diagnostic wire inserted. Cimetidine Counseling:  I discussed with the patient the risks of Cimetidine including but not limited to gynecomastia, headache, diarrhea, nausea, drowsiness, arrhythmias, pancreatitis, skin rashes, psychosis, bone marrow suppression and kidney toxicity.

## 2024-10-15 NOTE — PROGRESS NOTES
Subjective Data:  Patient still having mild chest pain.  No shortness of breath palpitations dizziness or syncope.    Overnight Events:    Telemetry overnight reviewed no events     Objective Data:  Last Recorded Vitals:  Vitals:    10/14/24 1902 10/14/24 2309 10/15/24 0349 10/15/24 0736   BP: 120/85 127/83 96/65 98/65   BP Location: Left arm Left arm Left arm Left arm   Patient Position: Lying Lying Lying Lying   Pulse: 88 77 77 74   Resp: 14 22 22 21   Temp: 36.4 °C (97.5 °F) 36.4 °C (97.5 °F) 36.5 °C (97.7 °F) 36.1 °C (97 °F)   TempSrc: Temporal Temporal Temporal Temporal   SpO2: 98% 97% 100%    Weight:   137 kg (302 lb 9.6 oz)    Height:           Last Labs:  CBC - 10/15/2024:  5:15 AM  14.4 8.0 416    26.0      CMP - 10/15/2024:  5:15 AM  9.4 6.4 22 --- 0.3   _ 3.8 18 50      PTT - No results in last year.  _   _ _     TROPHS   Date/Time Value Ref Range Status   10/11/2024 04:28 AM 30 0 - 13 ng/L Final   10/10/2024 05:18 PM 44 0 - 13 ng/L Final   10/10/2024 04:19 PM 13 0 - 13 ng/L Final     HGBA1C   Date/Time Value Ref Range Status   09/13/2022 04:00 AM 5.8 4.0 - 6.0 % Final     Comment:     Hemoglobin A1C levels are related to mean blood glucose during the   preceding 2-3 months. The relationship table below may be used as a   general guide. Each 1% increase in HGB A1C is a reflection of an   increase in mean glucose of approximately 30 mg/dl.   Reference: Diabetes Care, volume 29, supplement 1 Jan. 2006                        HGB A1C ................. Approx. Mean Glucose   _______________________________________________   6%   ...............................  120 mg/dl   7%   ...............................  150 mg/dl   8%   ...............................  180 mg/dl   9%   ...............................  210 mg/dl   10%  ...............................  240 mg/dl  Performed at [address] [address]       [facility]   Date/Time Value Ref Range Status   05/05/2023 02:30 PM 62 0 - 40 mg/dL  Final      Last I/O:  I/O last 3 completed shifts:  In: 840 (6.1 mL/kg) [P.O.:540; IV Piggyback:300]  Out: 0 (0 mL/kg)   Weight: 137.3 kg     Past Cardiology Tests (Last 3 Years):  EKG:  ECG 12 lead 10/10/2024 (Preliminary)      ECG 12 lead 10/10/2024    Echo:  Transthoracic Echo (TTE) Complete 10/11/2024    Ejection Fractions:  EF   Date/Time Value Ref Range Status   10/11/2024 01:38 PM 63 %      Cath:  No results found for this or any previous visit from the past 1095 days.    Stress Test:  Nuclear Stress Test 10/14/2024    Cardiac Imaging:  No results found for this or any previous visit from the past 1095 days.      Inpatient Medications:  Scheduled medications   Medication Dose Route Frequency    [MAR Hold - Suspended Admission] atorvastatin  40 mg oral Nightly    [MAR Hold - Suspended Admission] chlorhexidine  15 mL Mouth/Throat BID    [MAR Hold - Suspended Admission] docusate sodium  100 mg oral BID    [MAR Hold - Suspended Admission] levothyroxine  200 mcg oral Daily    [MAR Hold - Suspended Admission] melatonin  3 mg oral Nightly    [MAR Hold - Suspended Admission] methocarbamol  750 mg oral BID    [MAR Hold - Suspended Admission] metoprolol succinate XL  100 mg oral Daily    [MAR Hold - Suspended Admission] pantoprazole  40 mg oral Daily before breakfast    Or    [MAR Hold - Suspended Admission] pantoprazole  40 mg intravenous Daily before breakfast    [MAR Hold - Suspended Admission] piperacillin-tazobactam  4.5 g intravenous q8h    [MAR Hold - Suspended Admission] polyethylene glycol  17 g oral Daily    [MAR Hold - Suspended Admission] valACYclovir  1,000 mg oral TID     PRN medications   Medication    [MAR Hold - Suspended Admission] acetaminophen    Or    [MAR Hold - Suspended Admission] acetaminophen    Or    [MAR Hold - Suspended Admission] acetaminophen    [MAR Hold - Suspended Admission] benzocaine-menthol    [MAR Hold - Suspended Admission] cyclobenzaprine    [MAR Hold - Suspended Admission]  dextromethorphan-guaifenesin    [MAR Hold - Suspended Admission] guaiFENesin    [MAR Hold - Suspended Admission] hydrocortisone    [MAR Hold - Suspended Admission] ondansetron ODT    Or    [MAR Hold - Suspended Admission] ondansetron    [MAR Hold - Suspended Admission] traMADol     Continuous Medications   Medication Dose Last Rate       Physical Exam:  General: Patient is in no acute distress.  HEENT: atraumatic normocephalic.  Neck: is supple jugular venous pressure within normal limits no thyromegaly.  Cardiovascular regular rate and rhythm normal heart sounds no murmurs rubs or gallops.  Lungs: clear to auscultation bilaterally.  Abdomen: is soft nontender.  Extremities warm to touch no edema.        Assessment/Plan  1 tachycardia.  All EKGs that I reviewed showing sinus tachycardia.  There is no clear evidence of atrial flutter.  Patient received cardioversion yesterday for flutter according to the ER physician.  Was given Lovenox one-time.  Her [id number] score is 1.  Blood pressure and heart rate well-controlled with metoprolol.     2.  Chest pain.  Atypical chest pain associated with elevated white count.  No cough fever or chills.  Troponin slightly elevated after the cardioversion 43.  Repeated troponin trending down.  Nuclear stress test echo focal possible anterior wall ischemia.  Patient has her sister who is few years older than her having 3 stents mom had 5 stents father has 5 stents very strong coronary artery disease family.  Since she is still having chest pain and initially presented with chest pain and severe sinus tachycardia my recommendation is to proceed with cardiac catheterization which will be happening today at [facility].  Dr. Siu will arrange for that     3.  Hypertension.  Blood pressure well-controlled continue metoprolol 100 mg oral daily.     4.  Anemia.  Management by primary team     5.  Elevated white count.  Managed by primary team     6.  Hypothyroidism.  Patient on  levothyroxine.  Likely noncompliance with medications.  Management by primary team    Code Status:  Full Code      Yovani Pandey MD

## 2024-10-15 NOTE — PROGRESS NOTES
Occupational Therapy                 Therapy Communication Note    Patient Name: Elli Mejia  MRN: [id number]  Department: [address]  Room: [address] [place] [address]  Today's Date: 10/15/2024     Discipline: Occupational Therapy    Missed Visit Reason: Missed Visit Reason: Patient refused (Pt just back form heart cath, declining therapy at this time reporting that she is too uncomfortable with the  pain in her groin.)    Missed Time: Attempt    Comment:

## 2024-10-15 NOTE — PROGRESS NOTES
"Elli Mejia is a 44 y.o. female on day 5 of admission presenting with Atrial flutter with rapid ventricular response (Multi).    Subjective   Patient is going for cardiac cath     Objective     Physical Exam  Vitals reviewed.   Constitutional:       Appearance: Normal appearance.   HENT:      Head: Normocephalic and atraumatic.      Right Ear: Tympanic membrane, ear canal and external ear normal.      Left Ear: Tympanic membrane, ear canal and external ear normal.      Nose: Nose normal.      Mouth/Throat:      Pharynx: Oropharynx is clear.   Eyes:      Extraocular Movements: Extraocular movements intact.      Conjunctiva/sclera: Conjunctivae normal.      Pupils: Pupils are equal, round, and reactive to light.   Cardiovascular:      Rate and Rhythm: Normal rate and regular rhythm.      Pulses: Normal pulses.      Heart sounds: Normal heart sounds.   Pulmonary:      Effort: Pulmonary effort is normal.      Breath sounds: Normal breath sounds.   Abdominal:      General: Abdomen is flat. Bowel sounds are normal.      Palpations: Abdomen is soft.   Musculoskeletal:         General: Tenderness present.      Cervical back: Normal range of motion and neck supple.      Comments: Tenderness right shoulder   Skin:     General: Skin is warm and dry.   Neurological:      General: No focal deficit present.      Mental Status: She is alert and oriented to person, place, and time.   Psychiatric:         Mood and Affect: Mood normal.         Last Recorded Vitals  Blood pressure (!) 153/96, pulse 74, temperature 36.3 °C (97.4 °F), temperature source Temporal, resp. rate 18, height 1.676 m (5' 6\"), weight 137 kg (302 lb 9.6 oz), last menstrual period 09/29/2024, SpO2 99%.  Intake/Output last 3 Shifts:  I/O last 3 completed shifts:  In: 840 (6.1 mL/kg) [P.O.:540; IV Piggyback:300]  Out: 30 (0.2 mL/kg) [Blood:30]  Weight: 137.3 kg     Relevant Results               Scheduled medications  atorvastatin, 40 mg, oral, " Nightly  chlorhexidine, 15 mL, Mouth/Throat, BID  docusate sodium, 100 mg, oral, BID  levothyroxine, 200 mcg, oral, Daily  melatonin, 3 mg, oral, Nightly  methocarbamol, 750 mg, oral, BID  metoprolol succinate XL, 100 mg, oral, Daily  pantoprazole, 40 mg, oral, Daily before breakfast   Or  pantoprazole, 40 mg, intravenous, Daily before breakfast  piperacillin-tazobactam, 4.5 g, intravenous, q8h  polyethylene glycol, 17 g, oral, Daily      Continuous medications     PRN medications  PRN medications: acetaminophen **OR** acetaminophen **OR** acetaminophen, benzocaine-menthol, cyclobenzaprine, dextromethorphan-guaifenesin, guaiFENesin, hydrocortisone, ondansetron ODT **OR** ondansetron, traMADol  Results for orders placed or performed during the hospital encounter of 10/10/24 (from the past 24 hour(s))   CBC   Result Value Ref Range    WBC 14.4 (H) 4.4 - 11.3 x10*3/uL    nRBC 0.5 (H) 0.0 - 0.0 /100 WBCs    RBC 2.74 (L) 4.00 - 5.20 x10*6/uL    Hemoglobin 8.0 (L) 12.0 - 16.0 g/dL    Hematocrit 26.0 (L) 36.0 - 46.0 %    MCV 95 80 - 100 fL    MCH 29.2 26.0 - 34.0 pg    MCHC 30.8 (L) 32.0 - 36.0 g/dL    RDW 16.5 (H) 11.5 - 14.5 %    Platelets 416 150 - 450 x10*3/uL   Basic Metabolic Panel   Result Value Ref Range    Glucose 109 (H) 74 - 99 mg/dL    Sodium 136 136 - 145 mmol/L    Potassium 4.1 3.5 - 5.3 mmol/L    Chloride 102 98 - 107 mmol/L    Bicarbonate 24 21 - 32 mmol/L    Anion Gap 14 10 - 20 mmol/L    Urea Nitrogen 17 6 - 23 mg/dL    Creatinine 1.06 (H) 0.50 - 1.05 mg/dL    eGFR 67 >60 mL/min/1.73m*2    Calcium 9.4 8.6 - 10.3 mg/dL                  Assessment/Plan   Assessment & Plan  Atrial flutter with rapid ventricular response (Multi)    Anemia    Hyperlipidemia    Hypothyroidism    Menorrhagia with regular cycle    Depression with anxiety    Obesity, Class III, BMI 40-49.9 (morbid obesity) (Multi)    H/H stable  X-ray shoulder negative  MRI of the shoulder unremarkable  Continue physical therapy  Stress test  positive  Plan for cardiac cath today  Echo normal  Patient restarted on thyroid medication she was noncompliant will adjust dose after 6 weeks  Discharge planning based on cardiac cath results       I spent  minutes in the professional and overall care of this patient.      Nilam Sams MD

## 2024-10-15 NOTE — Clinical Note
Single view of the left ventricle obtained using power injection. Rate = 10 mL/sec. Total volume = 25 mL.

## 2024-10-15 NOTE — PROGRESS NOTES
Physical Therapy                 Therapy Communication Note    Patient Name: Elli Mejia  MRN: [id number]  Department: [facility]  Room: [address] [place], [place] [address]  Today's Date: 10/15/2024     Discipline: Physical Therapy    Missed Visit Reason: Missed Visit Reason: Cancel (Not back from [place] yet)    Missed Time: Cancel    Comment:

## 2024-10-15 NOTE — H&P
History Of Present Illness  Elli Mejia is a 44 y.o. female presenting with chest pain and palpitations.  Initially patient felt to have atrial flutter and was cardioverted, but review of telemetry suggested a sinus tachycardia.  With her chest pain she underwent a myocardial perfusion stress test suggesting mild anterior wall ischemia.  Patient was transferred here to [facility] cardiac catheterization laboratory for coronary angiography.     Past Medical History  Hypothyroidism    Surgical History  No past surgical history on file.     Social History  She reports that she has never smoked. She has never used smokeless tobacco. She reports that she does not drink alcohol and does not use drugs.    Family History  No family history on file.     Allergies  Patient has no known allergies.    Review of Systems     Physical Exam  Eyes:      Conjunctiva/sclera: Conjunctivae normal.   Cardiovascular:      Rate and Rhythm: Normal rate and regular rhythm.      Heart sounds:      No gallop.   Pulmonary:      Breath sounds: Normal breath sounds. No wheezing, rhonchi or rales.   Abdominal:      Palpations: Abdomen is soft.   Neurological:      General: No focal deficit present.      Mental Status: She is alert.        Constitutional:       Appearance: Not in distress.   Eyes:      Conjunctiva/sclera: Conjunctivae normal.   Neck:      Vascular: JVD normal.   Pulmonary:      Breath sounds: Normal breath sounds. No wheezing. No rhonchi. No rales.   Cardiovascular:      Normal rate. Regular rhythm.      Murmurs: There is no murmur.      No gallop.  No click. No rub.   Abdominal:      Palpations: Abdomen is soft.   Neurological:      General: No focal deficit present.      Mental Status: Alert.        Last Recorded Vitals  Last menstrual period 09/29/2024.    Relevant Results             Assessment/Plan   Assessment & Plan  Acute chest pain      Atypical chest pain, abnormal stress test  Sinus  tachycardia  Hypothyroidism    10/15: Patient is transferred from [facility] to [facility] for diagnostic cardiac catheterization.  Patient with atypical chest pain underwent myocardial perfusion stress test suggesting mild anterior wall ischemia.  Patient thus presents for diagnostic cardiac catheterization.  Discussed the risks of stroke, death, myocardial infarction, bleeding complications and renal failure with the patient.  She understands risks and agrees to proceed.  Will obtain coronary angiography and then consider treatment options.       I spent 20 minutes in the professional and overall care of this patient.      Javier Siu, DO

## 2024-10-15 NOTE — POST-PROCEDURE NOTE
Physician Transition of Care Summary  Invasive Cardiovascular Lab    Procedure Date: 10/15/2024  Attending:    * Javier Siu - Primary  Resident/Fellow/Other Assistant: Surgeons and Role:  * No surgeons found with a matching role *    Indications:   Pre-op Diagnosis      * Acute chest pain [R07.9]    Post-procedure diagnosis:   Post-op Diagnosis     * Acute chest pain [R07.9]    Procedure(s):   Left Heart Cath  59912 - GA L HRT CATH W/NJX L VENTRICULOGRAPHY IMG S&I        Procedure Findings:   Normal-appearing coronary arteries and normal left ventricular systolic function    Description of the Procedure:   Patient brought to the cardiac catheterization laboratory.  Right radial area was prepped and draped and local anesthesia obtained with 1% Xylocaine.  But unable to access right radial artery.  Thus transition to right femoral approach.  Local anesthesia obtained with 1% Xylocaine and right femoral artery puncture with a multipurpose needle.  0.035 guidewire inserted followed by insertion of 6 [place] arterial sheath.  JL 4 catheter was advanced in the left coronary system cannulated and viewed angiographically in multiple projections.  JL 4 catheter was removed change for a Ramon catheter which did not seat well into the right coronary artery.  It was exchanged for an AR-2 diagnostic catheter which nonselectively found the right coronary artery which had a very anterior takeoff.  The coronary vessels were all essentially normal in appearance.  The AR-2 catheter was removed and exchanged for a pigtail catheter which was advanced across aortic valve into left ventricle.  Left ventricular angiography was then performed in the MENDIETA projection revealing normal left ventricular systolic function.  The pigtail catheter was withdrawn and removed with pullback pressures revealing no gradient.  An angiographic view of the right femoral artery was obtained previously and an Angio-Seal closure device deployed.  Light  manual pressure was held and the patient transferred to the heart and vascular Center in stable condition.    Complications:   None    Stents/Implants:   Implants       No implant documentation for this case.            Anticoagulation/Antiplatelet Plan:   No anticoagulation or antiplatelet therapy will be needed.    Estimated Blood Loss:   * No values recorded between 10/15/2024 10:10 AM and 10/15/2024 11:32 AM *    Anesthesia: Moderate Sedation Anesthesia Staff: No anesthesia staff entered.    Any Specimen(s) Removed:   No specimens collected during this procedure.    Disposition:   Transfer back to [facility].      Electronically signed by: Javier Siu DO, 10/15/2024 11:32 AM

## 2024-10-16 VITALS
HEIGHT: 66 IN | BODY MASS INDEX: 47.09 KG/M2 | TEMPERATURE: 97.3 F | SYSTOLIC BLOOD PRESSURE: 123 MMHG | HEART RATE: 82 BPM | RESPIRATION RATE: 18 BRPM | WEIGHT: 293 LBS | DIASTOLIC BLOOD PRESSURE: 78 MMHG | OXYGEN SATURATION: 96 %

## 2024-10-16 PROCEDURE — 2500000001 HC RX 250 WO HCPCS SELF ADMINISTERED DRUGS (ALT 637 FOR MEDICARE OP): Performed by: INTERNAL MEDICINE

## 2024-10-16 PROCEDURE — 2500000004 HC RX 250 GENERAL PHARMACY W/ HCPCS (ALT 636 FOR OP/ED): Performed by: INTERNAL MEDICINE

## 2024-10-16 PROCEDURE — 99232 SBSQ HOSP IP/OBS MODERATE 35: CPT | Performed by: INTERNAL MEDICINE

## 2024-10-16 PROCEDURE — 99238 HOSP IP/OBS DSCHRG MGMT 30/<: CPT | Performed by: INTERNAL MEDICINE

## 2024-10-16 RX ORDER — METOPROLOL SUCCINATE 100 MG/1
100 TABLET, EXTENDED RELEASE ORAL DAILY
Qty: 30 TABLET | Refills: 0 | Status: SHIPPED | OUTPATIENT
Start: 2024-10-17

## 2024-10-16 RX ORDER — LEVOTHYROXINE SODIUM 200 UG/1
200 TABLET ORAL
Qty: 30 TABLET | Refills: 0 | Status: SHIPPED | OUTPATIENT
Start: 2024-10-17 | End: 2024-10-16

## 2024-10-16 ASSESSMENT — PAIN SCALES - GENERAL
PAINLEVEL_OUTOF10: 6
PAINLEVEL_OUTOF10: 0 - NO PAIN
PAINLEVEL_OUTOF10: 1
PAINLEVEL_OUTOF10: 6
PAINLEVEL_OUTOF10: 4

## 2024-10-16 ASSESSMENT — PAIN DESCRIPTION - LOCATION
LOCATION: SHOULDER
LOCATION: SHOULDER

## 2024-10-16 ASSESSMENT — PAIN - FUNCTIONAL ASSESSMENT
PAIN_FUNCTIONAL_ASSESSMENT: 0-10
PAIN_FUNCTIONAL_ASSESSMENT: 0-10

## 2024-10-16 ASSESSMENT — PAIN DESCRIPTION - ORIENTATION: ORIENTATION: RIGHT

## 2024-10-16 NOTE — PROGRESS NOTES
"Elli Mejia is a 44 y.o. female on day 6 of admission presenting with Atrial flutter with rapid ventricular response (Multi).    Subjective   Patient had cardiac cath done yesterday unremarkable     Objective     Physical Exam  Vitals reviewed.   Constitutional:       Appearance: Normal appearance.   HENT:      Head: Normocephalic and atraumatic.      Right Ear: Tympanic membrane, ear canal and external ear normal.      Left Ear: Tympanic membrane, ear canal and external ear normal.      Nose: Nose normal.      Mouth/Throat:      Pharynx: Oropharynx is clear.   Eyes:      Extraocular Movements: Extraocular movements intact.      Conjunctiva/sclera: Conjunctivae normal.      Pupils: Pupils are equal, round, and reactive to light.   Cardiovascular:      Rate and Rhythm: Normal rate and regular rhythm.      Pulses: Normal pulses.      Heart sounds: Normal heart sounds.   Pulmonary:      Effort: Pulmonary effort is normal.      Breath sounds: Normal breath sounds.   Abdominal:      General: Abdomen is flat. Bowel sounds are normal.      Palpations: Abdomen is soft.   Musculoskeletal:         General: Tenderness present.      Cervical back: Normal range of motion and neck supple.      Comments: Tenderness right shoulder   Skin:     General: Skin is warm and dry.   Neurological:      General: No focal deficit present.      Mental Status: She is alert and oriented to person, place, and time.   Psychiatric:         Mood and Affect: Mood normal.         Last Recorded Vitals  Blood pressure 123/78, pulse 82, temperature 36.3 °C (97.3 °F), temperature source Temporal, resp. rate 18, height 1.676 m (5' 6\"), weight 139 kg (306 lb 9.6 oz), last menstrual period 09/29/2024, SpO2 96%.  Intake/Output last 3 Shifts:  I/O last 3 completed shifts:  In: 740 (5.3 mL/kg) [P.O.:540; IV Piggyback:200]  Out: 30 (0.2 mL/kg) [Blood:30]  Weight: 139.1 kg     Relevant Results               Scheduled medications  atorvastatin, 40 mg, oral, " Nightly  chlorhexidine, 15 mL, Mouth/Throat, BID  docusate sodium, 100 mg, oral, BID  levothyroxine, 200 mcg, oral, Daily  melatonin, 3 mg, oral, Nightly  methocarbamol, 750 mg, oral, BID  metoprolol succinate XL, 100 mg, oral, Daily  pantoprazole, 40 mg, oral, Daily before breakfast   Or  pantoprazole, 40 mg, intravenous, Daily before breakfast  piperacillin-tazobactam, 4.5 g, intravenous, q8h  polyethylene glycol, 17 g, oral, Daily      Continuous medications     PRN medications  PRN medications: acetaminophen **OR** acetaminophen **OR** acetaminophen, benzocaine-menthol, cyclobenzaprine, dextromethorphan-guaifenesin, guaiFENesin, hydrocortisone, ondansetron ODT **OR** ondansetron, traMADol  No results found for this or any previous visit (from the past 24 hours).                 Assessment/Plan   Assessment & Plan  Atrial flutter with rapid ventricular response (Multi)    Anemia    Hyperlipidemia    Hypothyroidism    Menorrhagia with regular cycle    Depression with anxiety    Obesity, Class III, BMI 40-49.9 (morbid obesity) (Multi)    H/H stable  X-ray shoulder negative  MRI of the shoulder unremarkable  Continue physical therapy  Stress test positive  Cardiac Negative  Echo normal  Patient restarted on thyroid medication she was noncompliant will adjust dose after 6 weeks  Discharge today  Prescription for thyroid medication given for a month  Follow-up in a week       I spent  minutes in the professional and overall care of this patient.      Nilam Sams MD

## 2024-10-16 NOTE — DISCHARGE SUMMARY
Discharge Diagnosis  Atrial flutter with rapid ventricular response (Multi)    Issues Requiring Follow-Up  Hypothyroidism  Right shoulder pain    Test Results Pending At Discharge  Pending Labs       No current pending labs.            Hospital Course    Elli Mejia is a 44 y.o. female presenting with chest pain and palpitation.  Patient was doing fine yesterday when she suddenly developed retrosternal chest pain associated with palpitation.  She felt sharp pressure-like sensation.  In the emergency room EKG showed atrial flutter.  Patient was cardioverted.  Her heart rate dropped with she was in sinus tachycardia.  Started on diltiazem drip.  Patient is extremely anxious she has strong family history of heart disease in mother and sister.  She is on thyroid medication but very noncompliant and take it every other day.  She is also having very heavy periods for past 1 year.  She has not done follow-up with me for over a year has not done blood work.  She is also found to be  anemic   Stress test falsely positive cardiac cath negative.  No evidence of atrial flutter per cardiology.  Severely anemic related to menstrual flow.  Elevated TSH related to noncompliance with medication.  Patient restarted on thyroid medication  MRI shoulder negative except for mild strain  Discharge home and follow-up in office    Pertinent Physical Exam At Time of Discharge  Physical Exam  Vitals reviewed.   Constitutional:       Appearance: Normal appearance.   HENT:      Head: Normocephalic and atraumatic.      Right Ear: Tympanic membrane, ear canal and external ear normal.      Left Ear: Tympanic membrane, ear canal and external ear normal.      Nose: Nose normal.      Mouth/Throat:      Pharynx: Oropharynx is clear.   Eyes:      Extraocular Movements: Extraocular movements intact.      Conjunctiva/sclera: Conjunctivae normal.      Pupils: Pupils are equal, round, and reactive to light.   Cardiovascular:      Rate and Rhythm:  Normal rate and regular rhythm.      Pulses: Normal pulses.      Heart sounds: Normal heart sounds.   Pulmonary:      Effort: Pulmonary effort is normal.      Breath sounds: Normal breath sounds.   Abdominal:      General: Abdomen is flat. Bowel sounds are normal.      Palpations: Abdomen is soft.   Musculoskeletal:      Cervical back: Normal range of motion and neck supple.   Skin:     General: Skin is warm and dry.   Neurological:      General: No focal deficit present.      Mental Status: She is alert and oriented to person, place, and time.   Psychiatric:         Mood and Affect: Mood normal.         Home Medications     Medication List      START taking these medications     metoprolol succinate  mg 24 hr tablet; Commonly known as:   Toprol-XL; Take 1 tablet (100 mg) by mouth once daily. Do not crush or   chew.; Start taking on: October 17, 2024     CHANGE how you take these medications     levothyroxine 200 mcg tablet; Commonly known as: Synthroid, Levoxyl;   Take 1 tablet (200 mcg) by mouth early in the morning.. Take on an empty   stomach at the same time each day, either 30 to 60 minutes prior to   breakfast; Start taking on: October 17, 2024; What changed: when to take   this, additional instructions     CONTINUE taking these medications     atorvastatin 40 mg tablet; Commonly known as: Lipitor; TAKE 1 TABLET BY   MOUTH EVERY DAY     STOP taking these medications     chlorhexidine 0.12 % solution; Commonly known as: Peridex   cyclobenzaprine 10 mg tablet; Commonly known as: Flexeril   hydrocortisone 2.5 % cream   ketoconazole 2 % shampoo; Commonly known as: NIZOral   methocarbamol 750 mg tablet; Commonly known as: Robaxin   naproxen 500 mg tablet; Commonly known as: Naprosyn   oxyCODONE 5 mg immediate release tablet; Commonly known as: Roxicodone   Wegovy 0.25 mg/0.5 mL pen injector; Generic drug: semaglutide (weight   loss)     ASK your doctor about these medications     valACYclovir 1 gram tablet;  Commonly known as: Valtrex       Outpatient Follow-Up  No future appointments.    Nilam Sams MD

## 2024-10-16 NOTE — CARE PLAN
Problem: Pain  Goal: Takes deep breaths with improved pain control throughout the shift  Outcome: Progressing  Goal: Turns in bed with improved pain control throughout the shift  Outcome: Progressing  Goal: Walks with improved pain control throughout the shift  Outcome: Progressing  Goal: Performs ADL's with improved pain control throughout shift  Outcome: Progressing  Goal: Participates in PT with improved pain control throughout the shift  Outcome: Progressing  Goal: Free from opioid side effects throughout the shift  Outcome: Progressing  Goal: Free from acute confusion related to pain meds throughout the shift  Outcome: Progressing     Problem: Nutrition  Goal: Oral intake greater 75%  Outcome: Progressing     Problem: Pain - Adult  Goal: Verbalizes/displays adequate comfort level or baseline comfort level  Outcome: Progressing     Problem: Safety - Adult  Goal: Free from fall injury  Outcome: Progressing     Problem: Discharge Planning  Goal: Discharge to home or other facility with appropriate resources  Outcome: Progressing     Problem: Chronic Conditions and Co-morbidities  Goal: Patient's chronic conditions and co-morbidity symptoms are monitored and maintained or improved  Outcome: Progressing   The patient's goals for the shift include rest    The clinical goals for the shift include discharge    Over the shift, the patient did not make progress toward the following goals. Barriers to progression include na. Recommendations to address these barriers include na.

## 2024-10-16 NOTE — CARE PLAN
The patient's goals for the shift include rest    The clinical goals for the shift include monitor vs- monitor incision sites    Over the shift, the patient did make progress toward the following goals. Cath sites clean and dry -pain chronic right shoulder - ultram effective

## 2024-10-16 NOTE — PROGRESS NOTES
Subjective Data:  Patient is doing good.  Denies having chest pain.  Telemetry overnight reviewed no events    Overnight Events:    No events overnight     Objective Data:  Last Recorded Vitals:  Vitals:    10/16/24 0010 10/16/24 0442 10/16/24 0730 10/16/24 1116   BP: 127/59 138/60 114/72 123/78   BP Location: Right leg Right leg Right arm Right arm   Patient Position:  Lying Lying Lying   Pulse: 83 75 86 82   Resp: 18 20 18 18   Temp: 36.2 °C (97.2 °F) 35.8 °C (96.4 °F) 36.1 °C (97 °F) 36.3 °C (97.3 °F)   TempSrc: Temporal Temporal Temporal Temporal   SpO2: 100% 97% 97% 96%   Weight:  139 kg (306 lb 9.6 oz)     Height:           Last Labs:  CBC - 10/15/2024:  5:15 AM  14.4 8.0 416    26.0      CMP - 10/15/2024:  5:15 AM  9.4 6.4 22 --- 0.3   _ 3.8 18 50      PTT - No results in last year.  _   _ _     TROPHS   Date/Time Value Ref Range Status   10/11/2024 04:28 AM 30 0 - 13 ng/L Final   10/10/2024 05:18 PM 44 0 - 13 ng/L Final   10/10/2024 04:19 PM 13 0 - 13 ng/L Final     HGBA1C   Date/Time Value Ref Range Status   09/13/2022 04:00 AM 5.8 4.0 - 6.0 % Final     Comment:     Hemoglobin A1C levels are related to mean blood glucose during the   preceding 2-3 months. The relationship table below may be used as a   general guide. Each 1% increase in HGB A1C is a reflection of an   increase in mean glucose of approximately 30 mg/dl.   Reference: Diabetes Care, volume 29, supplement 1 Jan. 2006                        HGB A1C ................. Approx. Mean Glucose   _______________________________________________   6%   ...............................  120 mg/dl   7%   ...............................  150 mg/dl   8%   ...............................  180 mg/dl   9%   ...............................  210 mg/dl   10%  ...............................  240 mg/dl  Performed at [address] [address]       [facility]   Date/Time Value Ref Range Status   05/05/2023 02:30 PM 62 0 - 40 mg/dL Final      Last  I/O:  I/O last 3 completed shifts:  In: 740 (5.3 mL/kg) [P.O.:540; IV Piggyback:200]  Out: 30 (0.2 mL/kg) [Blood:30]  Weight: 139.1 kg     Past Cardiology Tests (Last 3 Years):  EKG:  ECG 12 lead 10/10/2024 (Preliminary)      ECG 12 lead 10/10/2024    Echo:  Transthoracic Echo (TTE) Complete 10/11/2024    Ejection Fractions:  EF   Date/Time Value Ref Range Status   10/11/2024 01:38 PM 63 %      Cath:  Cardiac Catheterization Procedure 10/15/2024    Stress Test:  Nuclear Stress Test 10/14/2024    Cardiac Imaging:  No results found for this or any previous visit from the past 1095 days.      Inpatient Medications:  Scheduled medications   Medication Dose Route Frequency    atorvastatin  40 mg oral Nightly    chlorhexidine  15 mL Mouth/Throat BID    docusate sodium  100 mg oral BID    levothyroxine  200 mcg oral Daily    melatonin  3 mg oral Nightly    methocarbamol  750 mg oral BID    metoprolol succinate XL  100 mg oral Daily    pantoprazole  40 mg oral Daily before breakfast    Or    pantoprazole  40 mg intravenous Daily before breakfast    piperacillin-tazobactam  4.5 g intravenous q8h    polyethylene glycol  17 g oral Daily     PRN medications   Medication    acetaminophen    Or    acetaminophen    Or    acetaminophen    benzocaine-menthol    cyclobenzaprine    dextromethorphan-guaifenesin    guaiFENesin    hydrocortisone    ondansetron ODT    Or    ondansetron    traMADol     Continuous Medications   Medication Dose Last Rate       Physical Exam:  General: Patient is in no acute distress.  HEENT: atraumatic normocephalic.  Neck: is supple jugular venous pressure within normal limits no thyromegaly.  Cardiovascular regular rate and rhythm normal heart sounds no murmurs rubs or gallops.  Lungs: clear to auscultation bilaterally.  Abdomen: is soft nontender.  Extremities warm to touch no edema.        Assessment/Plan  1 tachycardia.  All EKGs that I reviewed showing sinus tachycardia.  There is no clear evidence of  atrial flutter.  Patient received cardioversion yesterday for flutter according to the ER physician.  Was given Lovenox one-time.  Her [id number] score is 1.  Blood pressure and heart rate well-controlled with metoprolol.     2.  Chest pain.  Atypical chest pain associated with elevated white count.  No cough fever or chills.  Troponin slightly elevated after the cardioversion 43.  Repeated troponin trending down.  Nuclear stress test echo focal possible anterior wall ischemia.  Patient has her sister who is few years older than her having 3 stents mom had 5 stents father has 5 stents very strong coronary artery disease family.  Cardiac catheterization showed no significant coronary disease.  Patient stable at this point from my standpoint okay to discharge home follow-up as an outpatient.      3.  Hypertension.  Blood pressure well-controlled continue metoprolol 100 mg oral daily.     4.  Anemia.  Management by primary team     5.  Elevated white count.  Managed by primary team     6.  Hypothyroidism.  Patient on levothyroxine.  Likely noncompliance with medications.  Management by primary team  Peripheral IV 10/15/24 20 G Left;Proximal;Anterior Forearm (Active)   Site Assessment Clean;Dry;Intact 10/16/24 0900   Dressing Status Clean;Dry;Occlusive 10/16/24 0900   Number of days: 1       Code Status:  Full Code      Yovani Pandey MD

## 2024-10-16 NOTE — PROGRESS NOTES
Physical Therapy                 Therapy Communication Note    Patient Name: Elli Mejia  MRN: [id number]  Department: [address]  Room: [address] [place], [place] [address]  Today's Date: 10/16/2024     Discipline: Physical Therapy    Missed Visit Reason: Missed Visit Reason: Patient refused (To be discharged soon.)    Missed Time: Cancel    Comment:

## 2024-10-16 NOTE — PROGRESS NOTES
10/16/24 0858   Discharge Planning   Home or Post Acute Services None   Expected Discharge Disposition Home   Does the patient need discharge transport arranged? No

## 2024-10-16 NOTE — PROGRESS NOTES
Occupational Therapy                 Therapy Communication Note    Patient Name: Elli Mejia  MRN: [id number]  Department: [address]  Room: [address] [place], [place] [address]  Today's Date: 10/16/2024     Discipline: Occupational Therapy    Missed Visit Reason: Missed Visit Reason: Patient refused (Pt just back form heart cath, declining therapy at this time reporting that she is too uncomfortable with the  pain in her groin.)    Missed Time: Attempt    Comment: Pt declining therapy at this time. Wanting to rest prior to discharge this date.

## 2024-10-16 NOTE — NURSING NOTE
Patient discharged home with all belongings and discharge instructions; teach back method used to instruct patient on post cardiac cath instructions.

## 2024-10-17 ENCOUNTER — PATIENT OUTREACH (OUTPATIENT)
Dept: CARE COORDINATION | Facility: CLINIC | Age: 44
End: 2024-10-17
Payer: COMMERCIAL

## 2024-10-17 NOTE — PROGRESS NOTES
Discharge facility: [facility]   Discharge diagnosis: Atrial flutter with rapid ventricular response   Admission date: 10/10/24  Discharge date: 10/16/24  PCP Appointment Date: Needs scheduled   Specialist Appointment Date: Cardio needs scheduled    Hospital Encounter and Summary: Linked    Outreach call to patient to support a smooth transition of care from recent admission.  Left voicemail message for patient with my contact information. Will continue to follow through transition period.    Marlene Olmedo RN, [facility]  Accountable Care Organization Operations Office  [address] [place] [address]  Phone [phone]

## 2024-10-20 PROCEDURE — 93018 CV STRESS TEST I&R ONLY: CPT | Performed by: INTERNAL MEDICINE

## 2024-10-20 PROCEDURE — 93016 CV STRESS TEST SUPVJ ONLY: CPT | Performed by: INTERNAL MEDICINE

## 2024-10-31 ENCOUNTER — PATIENT OUTREACH (OUTPATIENT)
Dept: CARE COORDINATION | Facility: CLINIC | Age: 44
End: 2024-10-31
Payer: COMMERCIAL

## 2024-11-14 ENCOUNTER — PATIENT OUTREACH (OUTPATIENT)
Dept: CARE COORDINATION | Facility: CLINIC | Age: 44
End: 2024-11-14
Payer: COMMERCIAL

## 2024-11-14 NOTE — PROGRESS NOTES
Check in 30 days after hospital discharge to support smooth transition of care. No recent hospital admissions noted upon chart review. Will close this LYNSEY encounter at this time due to unable to reach patient upon 30 days.    Marlene Olmedo RN, [facility]  Phone [phone]       Tazorac Counseling:  Patient advised that medication is irritating and drying.  Patient may need to apply sparingly and wash off after an hour before eventually leaving it on overnight.  The patient verbalized understanding of the proper use and possible adverse effects of tazorac.  All of the patient's questions and concerns were addressed.

## 2024-12-11 NOTE — PROGRESS NOTES
Physical Therapy Evaluation and Treatment     Patient Name: Elli Mejia  MRN: [id number]  Encounter date: 12/13/2024       Visit # Visit count could not be calculated. Make sure you are using a visit which is associated with an episode. of ***  Visits/Dates Authorized: ***    Current Problem:   Problem List Items Addressed This Visit    None    Precautions:          Subjective Evaluation    History of Present Illness  Mechanism of injury: 44 year old female presents with right shoulder pain.     Diagnostic Tests  X-ray: normal (10/13/24)          Imaging:  Right shoulder MRI 10/13/24  FINDINGS:  Supraspinatus tendon demonstrates mild tendinosis. There is no focal  full-thickness rotator cuff tear. Supraspinatus muscle is  unremarkable.      Infraspinatus tendon demonstrates articular surface fraying of the  anterior tendon in the para footprint location. No full-thickness  perforation. Infraspinatus muscle is unremarkable      Subscapularis tendon is unremarkable. Subscapularis muscle is  unremarkable      Long head of the biceps tendon is unremarkable within the bicipital  groove. Intra-articular portion of the tendon proximally demonstrates  mild tendinosis      Glenohumeral articulation demonstrates no effusion. Glenoid labrum  demonstrates mild irregular morphology of the superior labrum about  the biceps labrum anchor with subtle component of tear suggested. No  paralabral cyst formation. Axillary pouch is unremarkable.  Quadrilateral space is unremarkable      No subacromial subdeltoid fluid. Acromioclavicular joint is  unremarkable. There is mild pericapsular edema about the  articulation. A type 1 acromion is seen.       IMPRESSION:  1. No focal full-thickness rotator cuff tear.  2. Articular surface fraying of the anterior infraspinatus tendon.  3. No glenohumeral joint effusion. Glenoid labrum demonstrates  irregular morphology of the superior labrum about the biceps anchor  with subtle component of  tear. No detachment demonstrated.  Objective    Vitals:       Cervical/Thoracic Spine    Lumbar Spine         {Balance Assessments:08143}       Treatments:     Therapeutic Exercise 55028:     Neuromuscular Re-Ed 96576:     Therapeutic Activity 46779:    Gait Training 22975:     Manual Therapy 77977:     Modalities:   {Modalities Used:32596}      HEP / Access Codes:       Assessment & Plan     Assessment  Impairments: abnormal or restricted ROM, activity intolerance, impaired physical strength, lacks appropriate home exercise program and pain with function  Assessment details: 44 year old patient presenting with right shoulder pain, causing difficulty with functional activity such as ***. Pt displays UE weakness, ROM deficits, postural deficits, pain, and impaired functional mobility. {Complexities:01049}  Patient would benefit from skilled physical therapy services to address these impairments and restore normal ROM and strength to reduce pain and improving activity participation  Prognosis: good    Plan  Therapy options: will be seen for skilled physical therapy services  Treatment plan discussed with: patient     Post-Treatment Symptoms:       Goals:   - Patient will demonstrate independence and report compliance with HEP to facilitate independent rehab program upon discharge.  - Pain at worst will be no more than 2/10 to allow patient to participate in home and community activities.  - Increase patient's right shoulder flexion and abduction AROM to 160 degrees to allow patient ability to reach into overhead cabinet to put dishes away for self-care independence.  -  Patient to wash their hair independently using right upper extremity for self-care independence without pain.  - Patient to use right upper extremity to reach behind her for the seatbelt and to fasten the seatbelt prior to driving independently.  - Patient to rotate neck to the right to look over shoulder when driving to safely switch lanes and back up  an automobile with no pain occurring.  - Pt will increase muscle strength by >/= 1/2 MMT to provide improved stability and ability to perform activities such as getting in/out of car, resume full household/recreational activities without pain, able to perform ADLs/IADLs without pain.  - Patient will demo decrease of SPADI by 13 points to demo improved function and meet MDC

## 2024-12-13 ENCOUNTER — APPOINTMENT (OUTPATIENT)
Dept: PHYSICAL THERAPY | Facility: CLINIC | Age: 44
End: 2024-12-13
Payer: COMMERCIAL

## 2024-12-23 NOTE — Clinical Note
Patient ID band present and verified. Discontinue Regimen: Clobetasol cream Detail Level: Zone Render In Strict Bullet Format?: No Initiate Treatment: Triamcinolone cream BID Monday through Friday topically to rash on legs.

## 2025-03-20 ENCOUNTER — TELEPHONE (OUTPATIENT)
Dept: RADIOLOGY | Facility: HOSPITAL | Age: 45
End: 2025-03-20
Payer: COMMERCIAL

## 2025-03-20 NOTE — TELEPHONE ENCOUNTER
3/20/25 1515  Patient found on imaging from 10/10/24 to have a few 6mm lung nodules. I called patient as she has had no follow up from her hospitalization at that time. She tells me that she has 5 kids that she is always taking here and there and doesn't have time for herself. She does see Dr. Sams, but usually for med refills and hasn't been in awhile. She asked me if I could reach out and get a scan ordered and then she could get it done at her convenience. I have messaged provider and will follow up with patient. NISHANT Lin

## 2025-04-10 DIAGNOSIS — R91.1 LUNG NODULE: ICD-10-CM

## 2025-07-10 ENCOUNTER — TELEPHONE (OUTPATIENT)
Dept: PULMONOLOGY | Facility: CLINIC | Age: 45
End: 2025-07-10
Payer: COMMERCIAL

## 2025-07-10 NOTE — TELEPHONE ENCOUNTER
07/10/2025 Called patient regarding follow-up on lung nodules. She stated she never heard from PCP office regarding making an appointment. I gave her number of another MD to schedule a new patient visit. Reminded her to ask regarding follow-up on her lung nodule when she meets with them. Almas Toledo RN.

## (undated) DEVICE — CLOSURE DEVICE, VASCULAR, ANGIO-SEAL, VIP, 6FR, LF

## (undated) DEVICE — GUIDEWIRE, J TIP, 3 MM, 0.035 IN X 150 CM, PTFE

## (undated) DEVICE — CATHETER, EXPO, MODEL-D, 6FR FL4

## (undated) DEVICE — GLIDESHEATH, SLENDER 6FR, 10CM, NITINOL KIT

## (undated) DEVICE — PACK, ANGIO P2, CUSTOM, LAKE

## (undated) DEVICE — CATHETER, ANGIO, EXPO, WRP, 6 FR X 100 CM

## (undated) DEVICE — GUIDEWIRE, J TIP, 3 MM, 0.035 IN X 260 CM, PTFE

## (undated) DEVICE — CATHETER, EXPO, AMPLATZ, AR2, 6 FR (5 BX)

## (undated) DEVICE — INTRODUCER, SHEATH, AVANTI PLUS, W/MINI WIRE, STANDARD, 6MS FR, 11 CM

## (undated) DEVICE — CATHETER, EXPO, MODEL-D, 6FR PIG 110CM

## (undated) DEVICE — KIT, NAMIC STANDARD LEFT HEART, CUSTOM, LWMC